# Patient Record
Sex: FEMALE | Race: OTHER | HISPANIC OR LATINO | URBAN - METROPOLITAN AREA
[De-identification: names, ages, dates, MRNs, and addresses within clinical notes are randomized per-mention and may not be internally consistent; named-entity substitution may affect disease eponyms.]

---

## 2023-12-26 ENCOUNTER — OUTPATIENT (OUTPATIENT)
Dept: EMERGENCY DEPT | Facility: HOSPITAL | Age: 53
LOS: 1 days | Discharge: HOME | End: 2023-12-26
Payer: MEDICAID

## 2023-12-26 VITALS
WEIGHT: 164.91 LBS | HEART RATE: 86 BPM | TEMPERATURE: 98 F | OXYGEN SATURATION: 97 % | RESPIRATION RATE: 20 BRPM | SYSTOLIC BLOOD PRESSURE: 117 MMHG | HEIGHT: 66 IN | DIASTOLIC BLOOD PRESSURE: 85 MMHG

## 2023-12-26 DIAGNOSIS — Z98.890 OTHER SPECIFIED POSTPROCEDURAL STATES: Chronic | ICD-10-CM

## 2023-12-26 DIAGNOSIS — R50.9 FEVER, UNSPECIFIED: ICD-10-CM

## 2023-12-26 LAB
ALBUMIN SERPL ELPH-MCNC: 3.6 G/DL — SIGNIFICANT CHANGE UP (ref 3.3–5)
ALBUMIN SERPL ELPH-MCNC: 3.6 G/DL — SIGNIFICANT CHANGE UP (ref 3.3–5)
ALP SERPL-CCNC: 57 U/L — SIGNIFICANT CHANGE UP (ref 40–120)
ALP SERPL-CCNC: 57 U/L — SIGNIFICANT CHANGE UP (ref 40–120)
ALT FLD-CCNC: 15 U/L — SIGNIFICANT CHANGE UP (ref 10–45)
ALT FLD-CCNC: 15 U/L — SIGNIFICANT CHANGE UP (ref 10–45)
ANION GAP SERPL CALC-SCNC: 10 MMOL/L — SIGNIFICANT CHANGE UP (ref 5–17)
ANION GAP SERPL CALC-SCNC: 10 MMOL/L — SIGNIFICANT CHANGE UP (ref 5–17)
APTT BLD: 29.7 SEC — SIGNIFICANT CHANGE UP (ref 24.5–35.6)
APTT BLD: 29.7 SEC — SIGNIFICANT CHANGE UP (ref 24.5–35.6)
AST SERPL-CCNC: 15 U/L — SIGNIFICANT CHANGE UP (ref 10–40)
AST SERPL-CCNC: 15 U/L — SIGNIFICANT CHANGE UP (ref 10–40)
BASOPHILS # BLD AUTO: 0.01 K/UL — SIGNIFICANT CHANGE UP (ref 0–0.2)
BASOPHILS # BLD AUTO: 0.01 K/UL — SIGNIFICANT CHANGE UP (ref 0–0.2)
BASOPHILS NFR BLD AUTO: 0.2 % — SIGNIFICANT CHANGE UP (ref 0–2)
BASOPHILS NFR BLD AUTO: 0.2 % — SIGNIFICANT CHANGE UP (ref 0–2)
BILIRUB SERPL-MCNC: 0.3 MG/DL — SIGNIFICANT CHANGE UP (ref 0.2–1.2)
BILIRUB SERPL-MCNC: 0.3 MG/DL — SIGNIFICANT CHANGE UP (ref 0.2–1.2)
BLD GP AB SCN SERPL QL: NEGATIVE — SIGNIFICANT CHANGE UP
BLD GP AB SCN SERPL QL: NEGATIVE — SIGNIFICANT CHANGE UP
BUN SERPL-MCNC: 8 MG/DL — SIGNIFICANT CHANGE UP (ref 7–23)
BUN SERPL-MCNC: 8 MG/DL — SIGNIFICANT CHANGE UP (ref 7–23)
CALCIUM SERPL-MCNC: 8.9 MG/DL — SIGNIFICANT CHANGE UP (ref 8.4–10.5)
CALCIUM SERPL-MCNC: 8.9 MG/DL — SIGNIFICANT CHANGE UP (ref 8.4–10.5)
CHLORIDE SERPL-SCNC: 106 MMOL/L — SIGNIFICANT CHANGE UP (ref 96–108)
CHLORIDE SERPL-SCNC: 106 MMOL/L — SIGNIFICANT CHANGE UP (ref 96–108)
CO2 SERPL-SCNC: 25 MMOL/L — SIGNIFICANT CHANGE UP (ref 22–31)
CO2 SERPL-SCNC: 25 MMOL/L — SIGNIFICANT CHANGE UP (ref 22–31)
CREAT SERPL-MCNC: 0.62 MG/DL — SIGNIFICANT CHANGE UP (ref 0.5–1.3)
CREAT SERPL-MCNC: 0.62 MG/DL — SIGNIFICANT CHANGE UP (ref 0.5–1.3)
EGFR: 107 ML/MIN/1.73M2 — SIGNIFICANT CHANGE UP
EGFR: 107 ML/MIN/1.73M2 — SIGNIFICANT CHANGE UP
EOSINOPHIL # BLD AUTO: 0.03 K/UL — SIGNIFICANT CHANGE UP (ref 0–0.5)
EOSINOPHIL # BLD AUTO: 0.03 K/UL — SIGNIFICANT CHANGE UP (ref 0–0.5)
EOSINOPHIL NFR BLD AUTO: 0.6 % — SIGNIFICANT CHANGE UP (ref 0–6)
EOSINOPHIL NFR BLD AUTO: 0.6 % — SIGNIFICANT CHANGE UP (ref 0–6)
GLUCOSE SERPL-MCNC: 101 MG/DL — HIGH (ref 70–99)
GLUCOSE SERPL-MCNC: 101 MG/DL — HIGH (ref 70–99)
HCG SERPL-ACNC: 4 MIU/ML — SIGNIFICANT CHANGE UP
HCG SERPL-ACNC: 4 MIU/ML — SIGNIFICANT CHANGE UP
HCT VFR BLD CALC: 39 % — SIGNIFICANT CHANGE UP (ref 34.5–45)
HCT VFR BLD CALC: 39 % — SIGNIFICANT CHANGE UP (ref 34.5–45)
HGB BLD-MCNC: 11.9 G/DL — SIGNIFICANT CHANGE UP (ref 11.5–15.5)
HGB BLD-MCNC: 11.9 G/DL — SIGNIFICANT CHANGE UP (ref 11.5–15.5)
IMM GRANULOCYTES NFR BLD AUTO: 0.2 % — SIGNIFICANT CHANGE UP (ref 0–0.9)
IMM GRANULOCYTES NFR BLD AUTO: 0.2 % — SIGNIFICANT CHANGE UP (ref 0–0.9)
INR BLD: 1.01 RATIO — SIGNIFICANT CHANGE UP (ref 0.85–1.18)
INR BLD: 1.01 RATIO — SIGNIFICANT CHANGE UP (ref 0.85–1.18)
LYMPHOCYTES # BLD AUTO: 1.89 K/UL — SIGNIFICANT CHANGE UP (ref 1–3.3)
LYMPHOCYTES # BLD AUTO: 1.89 K/UL — SIGNIFICANT CHANGE UP (ref 1–3.3)
LYMPHOCYTES # BLD AUTO: 35.4 % — SIGNIFICANT CHANGE UP (ref 13–44)
LYMPHOCYTES # BLD AUTO: 35.4 % — SIGNIFICANT CHANGE UP (ref 13–44)
MCHC RBC-ENTMCNC: 26.6 PG — LOW (ref 27–34)
MCHC RBC-ENTMCNC: 26.6 PG — LOW (ref 27–34)
MCHC RBC-ENTMCNC: 30.5 GM/DL — LOW (ref 32–36)
MCHC RBC-ENTMCNC: 30.5 GM/DL — LOW (ref 32–36)
MCV RBC AUTO: 87.2 FL — SIGNIFICANT CHANGE UP (ref 80–100)
MCV RBC AUTO: 87.2 FL — SIGNIFICANT CHANGE UP (ref 80–100)
MONOCYTES # BLD AUTO: 0.47 K/UL — SIGNIFICANT CHANGE UP (ref 0–0.9)
MONOCYTES # BLD AUTO: 0.47 K/UL — SIGNIFICANT CHANGE UP (ref 0–0.9)
MONOCYTES NFR BLD AUTO: 8.8 % — SIGNIFICANT CHANGE UP (ref 2–14)
MONOCYTES NFR BLD AUTO: 8.8 % — SIGNIFICANT CHANGE UP (ref 2–14)
NEUTROPHILS # BLD AUTO: 2.93 K/UL — SIGNIFICANT CHANGE UP (ref 1.8–7.4)
NEUTROPHILS # BLD AUTO: 2.93 K/UL — SIGNIFICANT CHANGE UP (ref 1.8–7.4)
NEUTROPHILS NFR BLD AUTO: 54.8 % — SIGNIFICANT CHANGE UP (ref 43–77)
NEUTROPHILS NFR BLD AUTO: 54.8 % — SIGNIFICANT CHANGE UP (ref 43–77)
NRBC # BLD: 0 /100 WBCS — SIGNIFICANT CHANGE UP (ref 0–0)
NRBC # BLD: 0 /100 WBCS — SIGNIFICANT CHANGE UP (ref 0–0)
PLATELET # BLD AUTO: 271 K/UL — SIGNIFICANT CHANGE UP (ref 150–400)
PLATELET # BLD AUTO: 271 K/UL — SIGNIFICANT CHANGE UP (ref 150–400)
POTASSIUM SERPL-MCNC: 3.8 MMOL/L — SIGNIFICANT CHANGE UP (ref 3.5–5.3)
POTASSIUM SERPL-MCNC: 3.8 MMOL/L — SIGNIFICANT CHANGE UP (ref 3.5–5.3)
POTASSIUM SERPL-SCNC: 3.8 MMOL/L — SIGNIFICANT CHANGE UP (ref 3.5–5.3)
POTASSIUM SERPL-SCNC: 3.8 MMOL/L — SIGNIFICANT CHANGE UP (ref 3.5–5.3)
PROT SERPL-MCNC: 6.2 G/DL — SIGNIFICANT CHANGE UP (ref 6–8.3)
PROT SERPL-MCNC: 6.2 G/DL — SIGNIFICANT CHANGE UP (ref 6–8.3)
PROTHROM AB SERPL-ACNC: 11.1 SEC — SIGNIFICANT CHANGE UP (ref 9.5–13)
PROTHROM AB SERPL-ACNC: 11.1 SEC — SIGNIFICANT CHANGE UP (ref 9.5–13)
RBC # BLD: 4.47 M/UL — SIGNIFICANT CHANGE UP (ref 3.8–5.2)
RBC # BLD: 4.47 M/UL — SIGNIFICANT CHANGE UP (ref 3.8–5.2)
RBC # FLD: 16.5 % — HIGH (ref 10.3–14.5)
RBC # FLD: 16.5 % — HIGH (ref 10.3–14.5)
RH IG SCN BLD-IMP: POSITIVE — SIGNIFICANT CHANGE UP
RH IG SCN BLD-IMP: POSITIVE — SIGNIFICANT CHANGE UP
SODIUM SERPL-SCNC: 141 MMOL/L — SIGNIFICANT CHANGE UP (ref 135–145)
SODIUM SERPL-SCNC: 141 MMOL/L — SIGNIFICANT CHANGE UP (ref 135–145)
WBC # BLD: 5.34 K/UL — SIGNIFICANT CHANGE UP (ref 3.8–10.5)
WBC # BLD: 5.34 K/UL — SIGNIFICANT CHANGE UP (ref 3.8–10.5)
WBC # FLD AUTO: 5.34 K/UL — SIGNIFICANT CHANGE UP (ref 3.8–10.5)
WBC # FLD AUTO: 5.34 K/UL — SIGNIFICANT CHANGE UP (ref 3.8–10.5)

## 2023-12-26 PROCEDURE — 88304 TISSUE EXAM BY PATHOLOGIST: CPT | Mod: 26

## 2023-12-26 PROCEDURE — 74177 CT ABD & PELVIS W/CONTRAST: CPT | Mod: 26,MA

## 2023-12-26 RX ORDER — ACETAMINOPHEN 500 MG
1000 TABLET ORAL ONCE
Refills: 0 | Status: COMPLETED | OUTPATIENT
Start: 2023-12-26 | End: 2023-12-26

## 2023-12-26 RX ORDER — INFLUENZA VIRUS VACCINE 15; 15; 15; 15 UG/.5ML; UG/.5ML; UG/.5ML; UG/.5ML
0.5 SUSPENSION INTRAMUSCULAR ONCE
Refills: 0 | Status: DISCONTINUED | OUTPATIENT
Start: 2023-12-26 | End: 2023-12-27

## 2023-12-26 RX ORDER — FENTANYL CITRATE 50 UG/ML
25 INJECTION INTRAVENOUS
Refills: 0 | Status: DISCONTINUED | OUTPATIENT
Start: 2023-12-26 | End: 2023-12-26

## 2023-12-26 RX ORDER — VANCOMYCIN HCL 1 G
1000 VIAL (EA) INTRAVENOUS ONCE
Refills: 0 | Status: COMPLETED | OUTPATIENT
Start: 2023-12-26 | End: 2023-12-26

## 2023-12-26 RX ORDER — MORPHINE SULFATE 50 MG/1
4 CAPSULE, EXTENDED RELEASE ORAL EVERY 4 HOURS
Refills: 0 | Status: DISCONTINUED | OUTPATIENT
Start: 2023-12-26 | End: 2023-12-27

## 2023-12-26 RX ORDER — PIPERACILLIN AND TAZOBACTAM 4; .5 G/20ML; G/20ML
3.38 INJECTION, POWDER, LYOPHILIZED, FOR SOLUTION INTRAVENOUS ONCE
Refills: 0 | Status: COMPLETED | OUTPATIENT
Start: 2023-12-26 | End: 2023-12-26

## 2023-12-26 RX ORDER — HEPARIN SODIUM 5000 [USP'U]/ML
5000 INJECTION INTRAVENOUS; SUBCUTANEOUS EVERY 8 HOURS
Refills: 0 | Status: DISCONTINUED | OUTPATIENT
Start: 2023-12-26 | End: 2023-12-27

## 2023-12-26 RX ORDER — OXYCODONE AND ACETAMINOPHEN 5; 325 MG/1; MG/1
1 TABLET ORAL EVERY 4 HOURS
Refills: 0 | Status: DISCONTINUED | OUTPATIENT
Start: 2023-12-26 | End: 2023-12-27

## 2023-12-26 RX ORDER — OXYCODONE AND ACETAMINOPHEN 5; 325 MG/1; MG/1
2 TABLET ORAL EVERY 6 HOURS
Refills: 0 | Status: DISCONTINUED | OUTPATIENT
Start: 2023-12-26 | End: 2023-12-27

## 2023-12-26 RX ADMIN — Medication 400 MILLIGRAM(S): at 08:04

## 2023-12-26 RX ADMIN — FENTANYL CITRATE 25 MICROGRAM(S): 50 INJECTION INTRAVENOUS at 16:30

## 2023-12-26 RX ADMIN — FENTANYL CITRATE 25 MICROGRAM(S): 50 INJECTION INTRAVENOUS at 16:20

## 2023-12-26 RX ADMIN — HEPARIN SODIUM 5000 UNIT(S): 5000 INJECTION INTRAVENOUS; SUBCUTANEOUS at 22:11

## 2023-12-26 RX ADMIN — FENTANYL CITRATE 25 MICROGRAM(S): 50 INJECTION INTRAVENOUS at 16:00

## 2023-12-26 RX ADMIN — Medication 250 MILLIGRAM(S): at 06:57

## 2023-12-26 RX ADMIN — PIPERACILLIN AND TAZOBACTAM 200 GRAM(S): 4; .5 INJECTION, POWDER, LYOPHILIZED, FOR SOLUTION INTRAVENOUS at 06:05

## 2023-12-26 RX ADMIN — OXYCODONE AND ACETAMINOPHEN 1 TABLET(S): 5; 325 TABLET ORAL at 23:11

## 2023-12-26 RX ADMIN — OXYCODONE AND ACETAMINOPHEN 1 TABLET(S): 5; 325 TABLET ORAL at 22:11

## 2023-12-26 RX ADMIN — FENTANYL CITRATE 25 MICROGRAM(S): 50 INJECTION INTRAVENOUS at 15:40

## 2023-12-26 NOTE — ED ADULT NURSE NOTE - OBJECTIVE STATEMENT
Patient is a 53 year old female complaining of back pain. Patient reports having a gluteal procedure done on may 25 2023, s/p revision on 10/26, was seen in office on 12/11 and had some bedside debridement done now complaining to the ER today for worsened pain in gluteal region at operative sites as well as fevers (Tmax 104F). On assessment patient is A&Ox4, ambulating steady, breathing comfortably on room air, no accessory muscle use, no cough, chest rise and fall equal, NSR on the cardiac monitor, no JVD, no edema noted, strong bilateral peripheral pulses, Abdomen is soft, nondistended, nontender, gluteal region has two b/l gluteal incisions that are non erythematous. Patient denies headache, dizziness, chest pain, palpitations, cough, SOB, abdominal pain, n/v/d, urinary symptoms, fevers, chills, weakness at this time.

## 2023-12-26 NOTE — ED ADULT NURSE NOTE - NS ED NURSE LEVEL OF CONSCIOUSNESS ORIENTATION
"Subjective:       Patient ID: Monique Rai is a 35 y.o. female.    Chief Complaint: Hypothyroidism (follow-up)    Ms. Rai is a 35 year old female with PMH with DRU, depression, thyroid disease who presents for hypothryoidism follow-up, DRU follow-up. Patient states she recently saw Dr. Yan who agreed with [place] 180mg. Patient states he was very thorough and agreed with the [place] for early/late dumping syndrome. Dr. Yan also stated that her CP and vision changes were also probably 2/2 inadequately controlled. States she should have went through radioactive iodine "since it metastacized" after two reactive LN"s were found. Patient needs a head and neck scan. They are going to determine a treatment plan in a month.       Review of Systems   Constitutional: Negative for chills and fever.   HENT: Negative for congestion and sore throat.    Eyes: Negative for pain and redness.   Respiratory: Negative for cough and shortness of breath.    Cardiovascular: Negative for chest pain and palpitations.   Gastrointestinal: Negative for abdominal pain, diarrhea, nausea and vomiting.   Genitourinary: Negative for difficulty urinating and dysuria.   Musculoskeletal: Negative for arthralgias and myalgias.   Skin: Negative for rash.   Neurological: Negative for syncope and headaches.   Psychiatric/Behavioral: Negative for agitation and confusion.       Objective:      Vitals:    10/11/18 1506   BP: 103/71   Pulse: 82     Physical Exam   Constitutional: She is oriented to person, place, and time. She appears well-developed and well-nourished. No distress.   HENT:   Head: Normocephalic and atraumatic.   Right Ear: External ear normal.   Left Ear: External ear normal.   Eyes: Conjunctivae are normal. Pupils are equal, round, and reactive to light. Right eye exhibits no discharge. Left eye exhibits no discharge.   Neck: Normal range of motion. No JVD present. No thyromegaly present.   Cardiovascular: Normal rate and " regular rhythm. Exam reveals no gallop and no friction rub.   No murmur heard.  Pulmonary/Chest: Effort normal and breath sounds normal. No respiratory distress. She has no wheezes. She has no rales.   Abdominal: Soft. Bowel sounds are normal. She exhibits no distension. There is no tenderness.   Musculoskeletal: Normal range of motion.   Neurological: She is alert and oriented to person, place, and time. She has normal reflexes.   Skin: Skin is warm. No rash noted. No erythema.   Psychiatric: She has a normal mood and affect. Her behavior is normal.       Assessment:       1. Postoperative hypothyroidism    2. DRU (generalized anxiety disorder)    3. Encounter for initial prescription of vaginal ring hormonal contraceptive    4. H/O thyroidectomy    5. Hx of papillary thyroid carcinoma    6. Need for prophylactic vaccination and inoculation against influenza        Plan:       Postoperative hypothyroidism    DRU (generalized anxiety disorder)  -     buPROPion (WELLBUTRIN XL) 150 MG TB24 tablet; Take 1 tablet (150 mg total) by mouth once daily.  Dispense: 30 tablet; Refill: 0    Encounter for initial prescription of vaginal ring hormonal contraceptive  -     etonogestrel-ethinyl estradiol (NUVARING) 0.12-0.015 mg/24 hr vaginal ring; Place 1 each vaginally every 28 days.  Dispense: 1 each; Refill: 11    H/O thyroidectomy    Hx of papillary thyroid carcinoma    Need for prophylactic vaccination and inoculation against influenza    Other orders  -     Influenza - Quadrivalent (3 years & older) (PF)      Follow-up in about 1 month (around 11/11/2018).       Oriented - self; Oriented - place; Oriented - time

## 2023-12-26 NOTE — H&P ADULT - ASSESSMENT
s/p removal of silicone bilateral buttocks with open wounds and drainage  Plan for OR washout and debridement followed by VAC placement  RBA reviewed with patient    NPO

## 2023-12-26 NOTE — ED ADULT NURSE REASSESSMENT NOTE - NS ED NURSE REASSESS COMMENT FT1
Received report from Krzysztof Gary. VSS. Patient endorses pain. ARSALAN Cunningham aware and placing order for pain medication at this time. Received report from rKzysztof Gary. VSS. Patient endorses pain. ARSALAN Cunningham aware and placing order for pain medication at this time.

## 2023-12-26 NOTE — ED ADULT NURSE NOTE - NSFALLUNIVINTERV_ED_ALL_ED
Medication requested: Symbicort 80-4.5 mcg/act  inhaler  Last Office Visit: 6-27-23  Next Office Visit: 6-20-24  Reason sent to MD: NA  Refill completed.       Bed/Stretcher in lowest position, wheels locked, appropriate side rails in place/Call bell, personal items and telephone in reach/Instruct patient to call for assistance before getting out of bed/chair/stretcher/Non-slip footwear applied when patient is off stretcher/[place] to call system/Physically safe environment - no spills, clutter or unnecessary equipment/Purposeful proactive rounding/Room/bathroom lighting operational, light cord in reach Bed/Stretcher in lowest position, wheels locked, appropriate side rails in place/Call bell, personal items and telephone in reach/Instruct patient to call for assistance before getting out of bed/chair/stretcher/Non-slip footwear applied when patient is off stretcher/[place] to call system/Physically safe environment - no spills, clutter or unnecessary equipment/Purposeful proactive rounding/Room/bathroom lighting operational, light cord in reach

## 2023-12-26 NOTE — ED PROVIDER NOTE - PHYSICAL EXAMINATION
see mdm Exam:   General: Non toxic, well appearing  HENT: No pharyngeal erythema/exudate, external ear exam normal  Eyes: PEERL, EOMI  Lungs: CTA B/L, no wheezing, no rales, no ronchi  Chest: Normal Heart sounds, no murmurs, no rubs no gallops  Abdomen: Soft, no tenderness, no rigidity, no guarding, neg Rovsing's signs, neg tenderness at Mckburney's point  gluteal region has two b/l gluteal incisons that are non erythematous, no overt s/o infection, packing in place apears clean, no purulence   MSK: FROM of joints, no tenderness to palpation  Skin: No new rashes or lesions  Neuro: Alert and oriented x 3, power 5/5 x 4, CN II-XII GI, no facial asymmetry, no cerebellar findings

## 2023-12-26 NOTE — H&P ADULT - NSHPLABSRESULTS_GEN_ALL_CORE
11.9   5.34  )-----------( 271      ( 26 Dec 2023 06:06 )             39.0   12-26    141  |  106  |  8   ----------------------------<  101<H>  3.8   |  25  |  0.62    Ca    8.9      26 Dec 2023 06:06    TPro  6.2  /  Alb  3.6  /  TBili  0.3  /  DBili  x   /  AST  15  /  ALT  15  /  AlkPhos  57  12-26

## 2023-12-26 NOTE — ED ADULT NURSE REASSESSMENT NOTE - NS ED NURSE REASSESS COMMENT FT1
Patient completely undressed. Belongings placed in an ED Safe envelope number [id number] and placed in Safe. Patient completely undressed. Belongings placed in an ED Safe envelope number [id number] and placed in Safe.

## 2023-12-26 NOTE — H&P ADULT - HISTORY OF PRESENT ILLNESS
Patient is 53 y.o. female who underwent excision of silicone granuloma bilateral buttocks and thighs 5/25/2023. 10/26/2023 underwent debridement of bilateral buttock seroma capsules and subsequent additional debridements. Presents with pain persistent open wounds and drainage.     Reports fevers at home    PMH: None    PSH: breast reduction, abdominoplasty, hysterectomy silicone removal    ALL: NKA         SOC:         Tob: denies    EtOH: socially         Height: 5’6”    Weight: 180 lb

## 2023-12-26 NOTE — ED ADULT NURSE NOTE - CHIEF COMPLAINT QUOTE
back pain x weeks, worsening today, pt reports surgery x8 months ago and still has 2 open wounds @ surgical site, surgeon instructed to come into ED for eval, tMax 104

## 2023-12-26 NOTE — ED PROVIDER NOTE - CLINICAL SUMMARY MEDICAL DECISION MAKING FREE TEXT BOX
Attending Montez:  50 y/o f presenting to ed w/ cc of gluteal pain and fever. Pt had gluteal procedure done on may 25 2023, s/p revioson on 10/26, was seen in office on  12/11 and had some bedside debridement done, not coming to ed w/ worsened pain in gluteal region at operative sites as well as fever, no current drainage, has packing in place currently, denies n/v, diarrhea, pos constipation, no urinary symptoms, on cephalosporin oupt. Partner states pt had a fevr of 103 this am so gave acetaminophen, no cp, sob, afebrile vitals stable  non toxic appears in mild discomfort, NC/AT,  conjunctiva non conjected, sclera anicteric, moist mucous membranes, neck supple, heart sounds, normal, no mrg, lungs cta b/l no wrr, abd soft non distended w/ no tenderness, examined gluteal region has two b/l gluteal incisons that are non erythematous, no overt s/o infection, packing in place apears clean, no purulence no visual deformities of extremities, axox3, , normal mood and affect, discussed w/ pt's plastic surgeon Dr. Dennis who plans to go to OR for washout due to persistent symptoms, will sent preop labs, will also get ct scan to screen for abscess/osteomyelitis, infectious tract, given fever of 103 today, will give broad abx. Attending Montez:  52 y/o f presenting to ed w/ cc of gluteal pain and fever. Pt had gluteal procedure done on may 25 2023, s/p revioson on 10/26, was seen in office on  12/11 and had some bedside debridement done, not coming to ed w/ worsened pain in gluteal region at operative sites as well as fever, no current drainage, has packing in place currently, denies n/v, diarrhea, pos constipation, no urinary symptoms, on cephalosporin oupt. Partner states pt had a fevr of 103 this am so gave acetaminophen, no cp, sob, afebrile vitals stable  non toxic appears in mild discomfort, NC/AT,  conjunctiva non conjected, sclera anicteric, moist mucous membranes, neck supple, heart sounds, normal, no mrg, lungs cta b/l no wrr, abd soft non distended w/ no tenderness, examined gluteal region has two b/l gluteal incisons that are non erythematous, no overt s/o infection, packing in place apears clean, no purulence no visual deformities of extremities, axox3, , normal mood and affect, discussed w/ pt's plastic surgeon Dr. Dennis who plans to go to OR for washout due to persistent symptoms, will sent preop labs, will also get ct scan to screen for abscess/osteomyelitis, infectious tract, given fever of 103 today, will give broad abx.

## 2023-12-26 NOTE — ED ADULT TRIAGE NOTE - CHIEF COMPLAINT QUOTE
back pain x weeks, worsening today, tMax 104 back pain x weeks, worsening today, pt reports surgery x8 months ago and still has 2 open wounds @ surgical site, surgeon instructed to come into ED for eval, tMax 104

## 2023-12-26 NOTE — H&P ADULT - NSHPPHYSICALEXAM_GEN_ALL_CORE
NAD   Breathing unlabored  Chest Clear  Abdomen Soft NT ND  Ext FROM No edema  Well perfused      Bilateral buttocks with ~5cm opening tracking to deeper cavity with seroma capsule drainage

## 2023-12-26 NOTE — ED PROVIDER NOTE - PROGRESS NOTE DETAILS
Attending Montez:  received phone call from Dr. Get Dennis, requesting labs be sent. Likely to go to OR, can admit under him. Will obtain labs, before admission

## 2023-12-26 NOTE — ED PROVIDER NOTE - OBJECTIVE STATEMENT
see mdm 50 y/o f presenting to ed w/ cc of gluteal pain and fever. Pt had gluteal procedure done on may 25 2023, s/p revioson on 10/26, was seen in office on  12/11 and had some bedside debridement done, not coming to ed w/ worsened pain in gluteal region at operative sites as well as fever, no current drainage, has packing in place currently, denies n/v, diarrhea, pos constipation, no urinary symptoms, on cephalosporin oupt. Partner states pt had a fevr of 103 this am so gave acetaminophen, no cp, sob

## 2023-12-26 NOTE — PATIENT PROFILE ADULT - FALL HARM RISK - HARM RISK INTERVENTIONS
Assistance with ambulation/Assistance OOB with selected safe patient handling equipment/Communicate Risk of Fall with Harm to all staff/Discuss with provider need for PT consult/Monitor gait and stability/Provide patient with walking aids - walker, cane, crutches/Reinforce activity limits and safety measures with patient and family/Sit up slowly, dangle for a short time, stand at bedside before walking/Tailored Fall Risk Interventions/Use of alarms - bed, chair and/or voice tab/Visual Cue: Yellow wristband and red socks/Bed in lowest position, wheels locked, appropriate side rails in place/Call bell, personal items and telephone in reach/Instruct patient to call for assistance before getting out of bed or chair/Non-slip footwear when patient is out of bed/[place] to call system/Physically safe environment - no spills, clutter or unnecessary equipment/Purposeful Proactive Rounding/Room/bathroom lighting operational, light cord in reach Assistance with ambulation/Assistance OOB with selected safe patient handling equipment/Communicate Risk of Fall with Harm to all staff/Discuss with provider need for PT consult/Monitor gait and stability/Provide patient with walking aids - walker, cane, crutches/Reinforce activity limits and safety measures with patient and family/Sit up slowly, dangle for a short time, stand at bedside before walking/Tailored Fall Risk Interventions/Use of alarms - bed, chair and/or voice tab/Visual Cue: Yellow wristband and red socks/Bed in lowest position, wheels locked, appropriate side rails in place/Call bell, personal items and telephone in reach/Instruct patient to call for assistance before getting out of bed or chair/Non-slip footwear when patient is out of bed/[place] to call system/Physically safe environment - no spills, clutter or unnecessary equipment/Purposeful Proactive Rounding/Room/bathroom lighting operational, light cord in reach

## 2023-12-26 NOTE — H&P ADULT - NSTOBACCOSCREENHP_GEN_A_NCS
Patient discharged to home at this time with mother. Discharge instructions and follow up care discussed, patients mother voices understanding.       Josep Tijerina RN  10/17/22 [id number]
No

## 2023-12-27 VITALS
OXYGEN SATURATION: 99 % | DIASTOLIC BLOOD PRESSURE: 60 MMHG | TEMPERATURE: 98 F | SYSTOLIC BLOOD PRESSURE: 110 MMHG | HEART RATE: 88 BPM | RESPIRATION RATE: 20 BRPM

## 2023-12-27 PROCEDURE — 86900 BLOOD TYPING SEROLOGIC ABO: CPT

## 2023-12-27 PROCEDURE — 84702 CHORIONIC GONADOTROPIN TEST: CPT

## 2023-12-27 PROCEDURE — 82330 ASSAY OF CALCIUM: CPT

## 2023-12-27 PROCEDURE — 85610 PROTHROMBIN TIME: CPT

## 2023-12-27 PROCEDURE — 85018 HEMOGLOBIN: CPT

## 2023-12-27 PROCEDURE — 83605 ASSAY OF LACTIC ACID: CPT

## 2023-12-27 PROCEDURE — 85014 HEMATOCRIT: CPT

## 2023-12-27 PROCEDURE — 11042 DBRDMT SUBQ TIS 1ST 20SQCM/<: CPT

## 2023-12-27 PROCEDURE — C9399: CPT

## 2023-12-27 PROCEDURE — 74177 CT ABD & PELVIS W/CONTRAST: CPT | Mod: MA

## 2023-12-27 PROCEDURE — 82803 BLOOD GASES ANY COMBINATION: CPT

## 2023-12-27 PROCEDURE — 88304 TISSUE EXAM BY PATHOLOGIST: CPT

## 2023-12-27 PROCEDURE — 86901 BLOOD TYPING SEROLOGIC RH(D): CPT

## 2023-12-27 PROCEDURE — 86850 RBC ANTIBODY SCREEN: CPT

## 2023-12-27 PROCEDURE — 82947 ASSAY GLUCOSE BLOOD QUANT: CPT

## 2023-12-27 PROCEDURE — 82435 ASSAY OF BLOOD CHLORIDE: CPT

## 2023-12-27 PROCEDURE — 84132 ASSAY OF SERUM POTASSIUM: CPT

## 2023-12-27 PROCEDURE — 85025 COMPLETE CBC W/AUTO DIFF WBC: CPT

## 2023-12-27 PROCEDURE — 85730 THROMBOPLASTIN TIME PARTIAL: CPT

## 2023-12-27 PROCEDURE — 84295 ASSAY OF SERUM SODIUM: CPT

## 2023-12-27 PROCEDURE — 87040 BLOOD CULTURE FOR BACTERIA: CPT

## 2023-12-27 PROCEDURE — 80053 COMPREHEN METABOLIC PANEL: CPT

## 2023-12-27 RX ADMIN — HEPARIN SODIUM 5000 UNIT(S): 5000 INJECTION INTRAVENOUS; SUBCUTANEOUS at 05:28

## 2023-12-27 NOTE — DISCHARGE NOTE PROVIDER - NSDCCPCAREPLAN_GEN_ALL_CORE_FT
PRINCIPAL DISCHARGE DIAGNOSIS  Diagnosis: Open wound of buttock  Assessment and Plan of Treatment:       SECONDARY DISCHARGE DIAGNOSES  Diagnosis: Postoperative complication  Assessment and Plan of Treatment:

## 2023-12-27 NOTE — PROGRESS NOTE ADULT - SUBJECTIVE AND OBJECTIVE BOX
Plastic Surgery Progress Note (pg [facility]: [id number], [facility]: [phone])    SUBJECTIVE  Patient seen on AM rounds, resting comfortably in bed. Pain well controlled, no complaints.    OBJECTIVE  ___________________________________________________  VITAL SIGNS / I&O's   Vital Signs Last 24 Hrs  T(C): 37.1 (27 Dec 2023 05:05), Max: 37.2 (26 Dec 2023 17:53)  T(F): 98.8 (27 Dec 2023 05:05), Max: 99 (26 Dec 2023 17:53)  HR: 97 (27 Dec 2023 05:05) (65 - 97)  BP: 99/65 (27 Dec 2023 05:05) (93/60 - 128/78)  BP(mean): 84 (26 Dec 2023 17:26) (72 - 92)  RR: 18 (27 Dec 2023 05:05) (14 - 18)  SpO2: 97% (27 Dec 2023 05:05) (92% - 100%)    Parameters below as of 27 Dec 2023 05:05  Patient On (Oxygen Delivery Method): room air          26 Dec 2023 07:01  -  27 Dec 2023 07:00  --------------------------------------------------------  IN:    Oral Fluid: 713 mL  Total IN: 713 mL    OUT:    Voided (mL): 500 mL  Total OUT: 500 mL    Total NET: 213 mL        ___________________________________________________  PHYSICAL EXAM    General: NAD  CV: Hemodynamically stable  Pulm: Nonlabored breathing on room air  Neuro: A&Ox4  MSK: Moves all extremities spontaneously. Vac holding suction    ___________________________________________________  LABS                        11.9   5.34  )-----------( 271      ( 26 Dec 2023 06:06 )             39.0     26 Dec 2023 06:06    141    |  106    |  8      ----------------------------<  101    3.8     |  25     |  0.62     Ca    8.9        26 Dec 2023 06:06    TPro  6.2    /  Alb  3.6    /  TBili  0.3    /  DBili  x      /  AST  15     /  ALT  15     /  AlkPhos  57     26 Dec 2023 06:06    PT/INR - ( 26 Dec 2023 06:06 )   PT: 11.1 sec;   INR: 1.01 ratio         PTT - ( 26 Dec 2023 06:06 )  PTT:29.7 sec  CAPILLARY BLOOD GLUCOSE    Urinalysis Basic - ( 26 Dec 2023 06:06 )    Color: x / Appearance: x / SG: x / pH: x  Gluc: 101 mg/dL / Ketone: x  / Bili: x / Urobili: x   Blood: x / Protein: x / Nitrite: x   Leuk Esterase: x / RBC: x / WBC x   Sq Epi: x / Non Sq Epi: x / Bacteria: x      ___________________________________________________  MICRO  Recent Cultures:    ___________________________________________________  MEDICATIONS  (STANDING):  heparin   Injectable 5000 Unit(s) SubCutaneous every 8 hours  influenza   Vaccine 0.5 milliLiter(s) IntraMuscular once    MEDICATIONS  (PRN):  morphine  - Injectable 4 milliGRAM(s) IV Push every 4 hours PRN Severe Breakthrough Pain (7 - 10)  oxycodone    5 mG/acetaminophen 325 mG 1 Tablet(s) Oral every 4 hours PRN Moderate Pain (4 - 6)  oxycodone    5 mG/acetaminophen 325 mG 2 Tablet(s) Oral every 6 hours PRN Severe Pain (7 - 10)   Plastic Surgery Progress Note (pg [facility]: [id number], [facility]: [phone])    SUBJECTIVE  Patient seen on AM rounds, resting comfortably in bed. Pain well controlled, no complaints.    OBJECTIVE  ___________________________________________________  VITAL SIGNS / I&O's   Vital Signs Last 24 Hrs  T(C): 37.1 (27 Dec 2023 05:05), Max: 37.2 (26 Dec 2023 17:53)  T(F): 98.8 (27 Dec 2023 05:05), Max: 99 (26 Dec 2023 17:53)  HR: 97 (27 Dec 2023 05:05) (65 - 97)  BP: 99/65 (27 Dec 2023 05:05) (93/60 - 128/78)  BP(mean): 84 (26 Dec 2023 17:26) (72 - 92)  RR: 18 (27 Dec 2023 05:05) (14 - 18)  SpO2: 97% (27 Dec 2023 05:05) (92% - 100%)    Parameters below as of 27 Dec 2023 05:05  Patient On (Oxygen Delivery Method): room air          26 Dec 2023 07:01  -  27 Dec 2023 07:00  --------------------------------------------------------  IN:    Oral Fluid: 713 mL  Total IN: 713 mL    OUT:    Voided (mL): 500 mL  Total OUT: 500 mL    Total NET: 213 mL        ___________________________________________________  PHYSICAL EXAM    General: NAD  CV: Hemodynamically stable  Pulm: Nonlabored breathing on room air  Neuro: A&Ox4  MSK: Moves all extremities spontaneously. Vac holding suction    ___________________________________________________  LABS                        11.9   5.34  )-----------( 271      ( 26 Dec 2023 06:06 )             39.0     26 Dec 2023 06:06    141    |  106    |  8      ----------------------------<  101    3.8     |  25     |  0.62     Ca    8.9        26 Dec 2023 06:06    TPro  6.2    /  Alb  3.6    /  TBili  0.3    /  DBili  x      /  AST  15     /  ALT  15     /  AlkPhos  57     26 Dec 2023 06:06    PT/INR - ( 26 Dec 2023 06:06 )   PT: 11.1 sec;   INR: 1.01 ratio         PTT - ( 26 Dec 2023 06:06 )  PTT:29.7 sec  CAPILLARY BLOOD GLUCOSE    Urinalysis Basic - ( 26 Dec 2023 06:06 )    Color: x / Appearance: x / SG: x / pH: x  Gluc: 101 mg/dL / Ketone: x  / Bili: x / Urobili: x   Blood: x / Protein: x / Nitrite: x   Leuk Esterase: x / RBC: x / WBC x   Sq Epi: x / Non Sq Epi: x / Bacteria: x      ___________________________________________________  MICRO  Recent Cultures:    ___________________________________________________  MEDICATIONS  (STANDING):  heparin   Injectable 5000 Unit(s) SubCutaneous every 8 hours  influenza   Vaccine 0.5 milliLiter(s) IntraMuscular once    MEDICATIONS  (PRN):  morphine  - Injectable 4 milliGRAM(s) IV Push every 4 hours PRN Severe Breakthrough Pain (7 - 10)  oxycodone    5 mG/acetaminophen 325 mG 1 Tablet(s) Oral every 4 hours PRN Moderate Pain (4 - 6)  oxycodone    5 mG/acetaminophen 325 mG 2 Tablet(s) Oral every 6 hours PRN Severe Pain (7 - 10)

## 2023-12-27 NOTE — DISCHARGE NOTE PROVIDER - CARE PROVIDER_API CALL
Get Dennis  Plastic Surgery  [address] [place] [address]  Phone: [phone]  Fax: [phone]  Established Patient  Follow Up Time:    Get Dennis  Plastic Surgery  [address] [place] [address]  Phone: [phone]  Fax: [phone]  Established Patient  Follow Up Time:

## 2023-12-27 NOTE — DISCHARGE NOTE PROVIDER - NSDCFUADDINST_GEN_ALL_CORE_FT
Continue your Prevena wound vac therapy and follow up with Dr. Dennis in the office. Do not submerge your wound or dressing in water.

## 2023-12-27 NOTE — DISCHARGE NOTE PROVIDER - HOSPITAL COURSE
52 yo F with history of silicone removal from bilateral buttocks 5/25/23 requiring subsequent debridement 10/26 and 12/11 presented to the ED on 12/26 with fevers and gluteal pain. Patient admitted for washout, debridement, and VAC to open wounds of bilateral buttocks 12/26. Tolerated well, remained afebrile and hemodynamically stable overnight with adequate pain control. Suitable for discharge to home 12/27 with continued vac therapy and outpatient followup. 54 yo F with history of silicone removal from bilateral buttocks 5/25/23 requiring subsequent debridement 10/26 and 12/11 presented to the ED on 12/26 with fevers and gluteal pain. Patient admitted for washout, debridement, and VAC to open wounds of bilateral buttocks 12/26. Tolerated well, remained afebrile and hemodynamically stable overnight with adequate pain control. Suitable for discharge to home 12/27 with continued vac therapy and outpatient followup.

## 2023-12-27 NOTE — PROGRESS NOTE ADULT - ASSESSMENT
53yFemale with history of silicone to bilateral buttocks removed 525/23 with open wounds and drainage, now s/p repeat washout and debridement followed by VAC placement 12/26. Recovering appropriately, afebrile and hemodynamically stable, vac dressing holding suction.     Plan:  - Will transition to home Prevena when  brings from home  - Dispo: dc today, outpatient follow up  - Regular diet  - Ambulate as tolerated  - Pain control  - DVT ppx: [facility]    Plastic and Reconstructive Surgery  NS: [phone] 53yFemale with history of silicone to bilateral buttocks removed 525/23 with open wounds and drainage, now s/p repeat washout and debridement followed by VAC placement 12/26. Recovering appropriately, afebrile and hemodynamically stable, vac dressing holding suction.     Plan:  - Will transition to home Prevena when  brings from home  - Dispo: dc today, outpatient follow up  - Regular diet  - Ambulate as tolerated  - Pain control  - DVT ppx: [facility]    Plastic and Reconstructive Surgery  NS: [phone]

## 2023-12-27 NOTE — DISCHARGE NOTE NURSING/CASE MANAGEMENT/SOCIAL WORK - NSDCPEFALRISK_GEN_ALL_CORE
For information on Fall & Injury Prevention, visit: [email or web address] [email or web address]-[email or web address] OR  [email or web address] [email or web address] OR  https://www.cdc.gov/steadi/patient.html For information on Fall & Injury Prevention, visit: [email or web address]-[email or web address]-[email or web address] OR  [email or web address]/[email or web address]-tips-[email or web address]-injury OR  https://www.cdc.gov/steadi/patient.html

## 2023-12-27 NOTE — DISCHARGE NOTE NURSING/CASE MANAGEMENT/SOCIAL WORK - PATIENT PORTAL LINK FT
You can access the FollowMyHealth Patient Portal offered by [facility] by registering at the following website: [email or web address]. By joining [employer]’s FollowMyHealth portal, you will also be able to view your health information using other applications (apps) compatible with our system. You can access the FollowMyHealth Patient Portal offered by [facility] by registering at the following website: [email or web address]. By joining [employer]’s FollowMyHealth portal, you will also be able to view your health information using other applications (apps) compatible with our system.

## 2023-12-31 LAB
CULTURE RESULTS: SIGNIFICANT CHANGE UP
SPECIMEN SOURCE: SIGNIFICANT CHANGE UP

## 2024-01-02 LAB
SURGICAL PATHOLOGY STUDY: SIGNIFICANT CHANGE UP
SURGICAL PATHOLOGY STUDY: SIGNIFICANT CHANGE UP

## 2024-02-02 NOTE — PATIENT PROFILE ADULT - NSPROPASSIVESMOKEEXPOSURE_GEN_A_NUR
Assumed care of pt  at 1900. Report received from Day RN. Pt is A&O x 4. Patient stated that their pain is 6/10, pt has been medicated per the MAR. Pt's pain comfort goal is 3/10. Pt educated on how to use the call light, pt verbalized understanding. Bed in lowest locked position, call light within reach, hourly rounding in place. Labs reviewed, orders reviewed, communication board updated. Pt declines any further needs at this time.    No

## 2024-02-23 ENCOUNTER — INPATIENT (INPATIENT)
Facility: HOSPITAL | Age: 54
LOS: 1 days | Discharge: HOME | DRG: 857 | End: 2024-02-25
Attending: SURGERY | Admitting: SURGERY
Payer: MEDICAID

## 2024-02-23 VITALS
DIASTOLIC BLOOD PRESSURE: 81 MMHG | HEART RATE: 86 BPM | SYSTOLIC BLOOD PRESSURE: 139 MMHG | OXYGEN SATURATION: 100 % | WEIGHT: 164.91 LBS | RESPIRATION RATE: 20 BRPM | TEMPERATURE: 99 F | HEIGHT: 66 IN

## 2024-02-23 DIAGNOSIS — Z98.890 OTHER SPECIFIED POSTPROCEDURAL STATES: Chronic | ICD-10-CM

## 2024-02-23 DIAGNOSIS — T14.8XXA OTHER INJURY OF UNSPECIFIED BODY REGION, INITIAL ENCOUNTER: ICD-10-CM

## 2024-02-23 LAB
ALBUMIN SERPL ELPH-MCNC: 3.9 G/DL — SIGNIFICANT CHANGE UP (ref 3.3–5)
ALP SERPL-CCNC: 63 U/L — SIGNIFICANT CHANGE UP (ref 40–120)
ALT FLD-CCNC: 13 U/L — SIGNIFICANT CHANGE UP (ref 10–45)
ANION GAP SERPL CALC-SCNC: 11 MMOL/L — SIGNIFICANT CHANGE UP (ref 5–17)
AST SERPL-CCNC: 25 U/L — SIGNIFICANT CHANGE UP (ref 10–40)
BASE EXCESS BLDV CALC-SCNC: 4.1 MMOL/L — HIGH (ref -2–3)
BASOPHILS # BLD AUTO: 0.02 K/UL — SIGNIFICANT CHANGE UP (ref 0–0.2)
BASOPHILS NFR BLD AUTO: 0.4 % — SIGNIFICANT CHANGE UP (ref 0–2)
BILIRUB SERPL-MCNC: 0.2 MG/DL — SIGNIFICANT CHANGE UP (ref 0.2–1.2)
BUN SERPL-MCNC: 13 MG/DL — SIGNIFICANT CHANGE UP (ref 7–23)
CA-I SERPL-SCNC: 1.25 MMOL/L — SIGNIFICANT CHANGE UP (ref 1.15–1.33)
CALCIUM SERPL-MCNC: 9.2 MG/DL — SIGNIFICANT CHANGE UP (ref 8.4–10.5)
CHLORIDE BLDV-SCNC: 104 MMOL/L — SIGNIFICANT CHANGE UP (ref 96–108)
CHLORIDE SERPL-SCNC: 103 MMOL/L — SIGNIFICANT CHANGE UP (ref 96–108)
CO2 BLDV-SCNC: 33 MMOL/L — HIGH (ref 22–26)
CO2 SERPL-SCNC: 26 MMOL/L — SIGNIFICANT CHANGE UP (ref 22–31)
CREAT SERPL-MCNC: 0.54 MG/DL — SIGNIFICANT CHANGE UP (ref 0.5–1.3)
EGFR: 110 ML/MIN/1.73M2 — SIGNIFICANT CHANGE UP
EOSINOPHIL # BLD AUTO: 0.06 K/UL — SIGNIFICANT CHANGE UP (ref 0–0.5)
EOSINOPHIL NFR BLD AUTO: 1.1 % — SIGNIFICANT CHANGE UP (ref 0–6)
GAS PNL BLDV: 137 MMOL/L — SIGNIFICANT CHANGE UP (ref 136–145)
GAS PNL BLDV: SIGNIFICANT CHANGE UP
GLUCOSE BLDV-MCNC: 82 MG/DL — SIGNIFICANT CHANGE UP (ref 70–99)
GLUCOSE SERPL-MCNC: 83 MG/DL — SIGNIFICANT CHANGE UP (ref 70–99)
HCG UR QL: NEGATIVE — SIGNIFICANT CHANGE UP
HCO3 BLDV-SCNC: 31 MMOL/L — HIGH (ref 22–29)
HCT VFR BLD CALC: 37.6 % — SIGNIFICANT CHANGE UP (ref 34.5–45)
HCT VFR BLDA CALC: 37 % — SIGNIFICANT CHANGE UP (ref 34.5–46.5)
HGB BLD CALC-MCNC: 12.3 G/DL — SIGNIFICANT CHANGE UP (ref 11.7–16.1)
HGB BLD-MCNC: 11.5 G/DL — SIGNIFICANT CHANGE UP (ref 11.5–15.5)
IMM GRANULOCYTES NFR BLD AUTO: 0.2 % — SIGNIFICANT CHANGE UP (ref 0–0.9)
LACTATE BLDV-MCNC: 1.1 MMOL/L — SIGNIFICANT CHANGE UP (ref 0.5–2)
LYMPHOCYTES # BLD AUTO: 2.26 K/UL — SIGNIFICANT CHANGE UP (ref 1–3.3)
LYMPHOCYTES # BLD AUTO: 41.8 % — SIGNIFICANT CHANGE UP (ref 13–44)
MCHC RBC-ENTMCNC: 25.7 PG — LOW (ref 27–34)
MCHC RBC-ENTMCNC: 30.6 GM/DL — LOW (ref 32–36)
MCV RBC AUTO: 84.1 FL — SIGNIFICANT CHANGE UP (ref 80–100)
MONOCYTES # BLD AUTO: 0.52 K/UL — SIGNIFICANT CHANGE UP (ref 0–0.9)
MONOCYTES NFR BLD AUTO: 9.6 % — SIGNIFICANT CHANGE UP (ref 2–14)
NEUTROPHILS # BLD AUTO: 2.54 K/UL — SIGNIFICANT CHANGE UP (ref 1.8–7.4)
NEUTROPHILS NFR BLD AUTO: 46.9 % — SIGNIFICANT CHANGE UP (ref 43–77)
NRBC # BLD: 0 /100 WBCS — SIGNIFICANT CHANGE UP (ref 0–0)
PCO2 BLDV: 58 MMHG — HIGH (ref 39–42)
PH BLDV: 7.34 — SIGNIFICANT CHANGE UP (ref 7.32–7.43)
PLATELET # BLD AUTO: 344 K/UL — SIGNIFICANT CHANGE UP (ref 150–400)
PO2 BLDV: 28 MMHG — SIGNIFICANT CHANGE UP (ref 25–45)
POTASSIUM BLDV-SCNC: 4.4 MMOL/L — SIGNIFICANT CHANGE UP (ref 3.5–5.1)
POTASSIUM SERPL-MCNC: 4.6 MMOL/L — SIGNIFICANT CHANGE UP (ref 3.5–5.3)
POTASSIUM SERPL-SCNC: 4.6 MMOL/L — SIGNIFICANT CHANGE UP (ref 3.5–5.3)
PROT SERPL-MCNC: 7 G/DL — SIGNIFICANT CHANGE UP (ref 6–8.3)
RBC # BLD: 4.47 M/UL — SIGNIFICANT CHANGE UP (ref 3.8–5.2)
RBC # FLD: 15.7 % — HIGH (ref 10.3–14.5)
SAO2 % BLDV: 32.7 % — LOW (ref 67–88)
SODIUM SERPL-SCNC: 140 MMOL/L — SIGNIFICANT CHANGE UP (ref 135–145)
WBC # BLD: 5.41 K/UL — SIGNIFICANT CHANGE UP (ref 3.8–10.5)
WBC # FLD AUTO: 5.41 K/UL — SIGNIFICANT CHANGE UP (ref 3.8–10.5)

## 2024-02-23 PROCEDURE — 99285 EMERGENCY DEPT VISIT HI MDM: CPT

## 2024-02-23 PROCEDURE — 93010 ELECTROCARDIOGRAM REPORT: CPT

## 2024-02-23 RX ORDER — ONDANSETRON 8 MG/1
4 TABLET, FILM COATED ORAL EVERY 4 HOURS
Refills: 0 | Status: DISCONTINUED | OUTPATIENT
Start: 2024-02-23 | End: 2024-02-24

## 2024-02-23 RX ORDER — SENNA PLUS 8.6 MG/1
2 TABLET ORAL AT BEDTIME
Refills: 0 | Status: DISCONTINUED | OUTPATIENT
Start: 2024-02-23 | End: 2024-02-24

## 2024-02-23 RX ORDER — INFLUENZA VIRUS VACCINE 15; 15; 15; 15 UG/.5ML; UG/.5ML; UG/.5ML; UG/.5ML
0.5 SUSPENSION INTRAMUSCULAR ONCE
Refills: 0 | Status: DISCONTINUED | OUTPATIENT
Start: 2024-02-23 | End: 2024-02-25

## 2024-02-23 RX ORDER — ENOXAPARIN SODIUM 100 MG/ML
40 INJECTION SUBCUTANEOUS EVERY 24 HOURS
Refills: 0 | Status: DISCONTINUED | OUTPATIENT
Start: 2024-02-23 | End: 2024-02-24

## 2024-02-23 RX ORDER — ACETAMINOPHEN 500 MG
975 TABLET ORAL EVERY 6 HOURS
Refills: 0 | Status: DISCONTINUED | OUTPATIENT
Start: 2024-02-23 | End: 2024-02-24

## 2024-02-23 RX ORDER — IBUPROFEN 200 MG
400 TABLET ORAL EVERY 6 HOURS
Refills: 0 | Status: DISCONTINUED | OUTPATIENT
Start: 2024-02-23 | End: 2024-02-24

## 2024-02-23 RX ORDER — LANOLIN ALCOHOL/MO/W.PET/CERES
3 CREAM (GRAM) TOPICAL AT BEDTIME
Refills: 0 | Status: DISCONTINUED | OUTPATIENT
Start: 2024-02-23 | End: 2024-02-24

## 2024-02-23 RX ORDER — OXYCODONE HYDROCHLORIDE 5 MG/1
5 TABLET ORAL EVERY 4 HOURS
Refills: 0 | Status: DISCONTINUED | OUTPATIENT
Start: 2024-02-23 | End: 2024-02-24

## 2024-02-23 RX ORDER — SODIUM CHLORIDE 9 MG/ML
1000 INJECTION, SOLUTION INTRAVENOUS
Refills: 0 | Status: DISCONTINUED | OUTPATIENT
Start: 2024-02-24 | End: 2024-02-24

## 2024-02-23 RX ORDER — DIPHENHYDRAMINE HCL 50 MG
25 CAPSULE ORAL EVERY 4 HOURS
Refills: 0 | Status: DISCONTINUED | OUTPATIENT
Start: 2024-02-23 | End: 2024-02-24

## 2024-02-23 RX ADMIN — OXYCODONE HYDROCHLORIDE 5 MILLIGRAM(S): 5 TABLET ORAL at 14:41

## 2024-02-23 RX ADMIN — ENOXAPARIN SODIUM 40 MILLIGRAM(S): 100 INJECTION SUBCUTANEOUS at 17:08

## 2024-02-23 RX ADMIN — SENNA PLUS 2 TABLET(S): 8.6 TABLET ORAL at 21:10

## 2024-02-23 RX ADMIN — OXYCODONE HYDROCHLORIDE 5 MILLIGRAM(S): 5 TABLET ORAL at 13:41

## 2024-02-23 NOTE — H&P ADULT - NSHPLABSRESULTS_GEN_ALL_CORE
LABS:  cret                        11.5   5.41  )-----------( 344      ( 23 Feb 2024 11:28 )             37.6     02-23    140  |  103  |  13  ----------------------------<  83  4.6   |  26  |  0.54    Ca    9.2      23 Feb 2024 11:28    TPro  7.0  /  Alb  3.9  /  TBili  0.2  /  DBili  x   /  AST  25  /  ALT  13  /  AlkPhos  63  02-23

## 2024-02-23 NOTE — ED ADULT TRIAGE NOTE - CHIEF COMPLAINT QUOTE
Remote BP  
bilateral buttock infection to surgical site and fever since last night   had I&D x2 months ago

## 2024-02-23 NOTE — ED ADULT NURSE NOTE - OBJECTIVE STATEMENT
54YOF BIB self c/o fever and b/l buttock incision pain, had incision and drainage 2 months ago for post-op abscess, no hx, denies allergies and home medications. Dressings have been performed by outpatient plastic surgeon. Highest fever taken at home 103F orally. Incisions appear clean, no drainage present, dressings dry and intact, pt endorses moderate pain and tenderness on palpation to area around sites, packing mild bleeding. Pt breathing spontaneously, peripheral pulses present x4. Denies chest pain/SOB/nausea/vomiting/diarrhea/constipation. Denies chills. Pt ambulates at home, AOx4.

## 2024-02-23 NOTE — ED PROVIDER NOTE - ATTENDING CONTRIBUTION TO CARE
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  Tmax 103F yesterday.  No vomiting or diarrhea no difficulty urinating or defecating.    Review of Systems:  -General: + Fever   -ENT: no congestion  -Pulmonary: no cough, no shortness of breath  -Cardiac: no chest pain  -Gastrointestinal: no abdominal pain, no nausea, no vomiting, and no diarrhea.  -Genitourinary: no blood or pain with urination  -Musculoskeletal: no back or neck pain  -Endocrine: No h/o diabetes    On Physical Exam:  General: well appearing, in NAD, speaking clearly in full sentences and without difficulty; cooperative with exam  HEENT: anicteric sclera, airway patent  Neck: no JVD  Abdomen: soft nontender/nondistended  : no bladder tenderness or distension  Back:  Skin: intact, no rash  Extremities: no peripheral edema, no gross deformities    MDM: Concern for possible abscess or infection of surgical sites.  Is not febrile or appearing septic on initial evaluation here.  Case discussed with plastic surgery attending Dr. Dennis who is aware of patient and request admission to his service; case also discussed with plastic surgery resident/team at bedside no antibiotics at this time sending screening labs/presurgical lab panel and admitting.  No gross evidence of necrotizing fasciitis on exam no gross evidence of sepsis on overall exam and evaluation.

## 2024-02-23 NOTE — ED PROVIDER NOTE - CLINICAL SUMMARY MEDICAL DECISION MAKING FREE TEXT BOX
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  Also endorsing pain over bilateral incision areas.  Discussed with plastic surgeon Dr. Get Dennis who told her to come to emergency department for further evaluation.  Patient denies headache, chest pain, shortness of breath, abdominal pain, nausea, vomiting, diarrhea, cough, nasal congestion, dysuria, hematuria, extremity edema, rash.  Last took Tylenol this morning at 8 AM.    In the emergency department patient is hemodynamically stable and afebrile.  On exam patient has 2 bilateral incisions to the superior lateral aspect of bilateral buttocks roughly 7 cm long each, open with packing in place.  Minimal tenderness without any significant erythema along edge of wound.  Packing covered and yellow/clear fluid.  No active draining.  Neurovascularly intact bilateral lower extremities.  Able to ambulate without difficulty.  Discussed case with Dr. Get Dennis who is aware of patient and is requesting admission to his service.  Requesting preop labs be drawn.  Not requesting cultures at this time.  Will page plastic surgery resident to notify patient is here.  Will admit to Dr. Dennis service.  Patient in agreement with plan.

## 2024-02-23 NOTE — H&P ADULT - NSHPPHYSICALEXAM_GEN_ALL_CORE
PHYSICAL EXAM  GENERAL: NAD, lying in bed comfortably  CHEST: nonlabored, no increased WOB  ABDOMEN: Soft, Nontender, Nondistended.  BUTTOCK: Upper right buttock incision packed and with dressings c/d/i.   EXTREMITIES: Well perfused. No clubbing, cyanosis, or edema  NERVOUS SYSTEM:  Alert & Oriented X3

## 2024-02-23 NOTE — H&P ADULT - HISTORY OF PRESENT ILLNESS
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 S/p washout/debridement and VAC to open wounds of bilateral buttocks s/p silicone removal (initial excision 5/25/23, debridement 10/26, office debridement 12/11).    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 S/p washout/debridement and VAC to open wounds of bilateral buttocks s/p silicone removal (initial excision 5/25/23, debridement 10/26, office debridement 12/11).

## 2024-02-23 NOTE — CHART NOTE - NSCHARTNOTEFT_GEN_A_CORE
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 S/p washout/debridement and VAC to open wounds of bilateral buttocks s/p silicone removal (initial excision 5/25/23, debridement 10/26, office debridement 12/11). Presented with non-healing wounds and intermittent fevers.       Past Medical History:  No pertinent past medical history    Granuloma of buttock        Allergies: No Known Allergies      Medications:        Vital Signs:   T(F): 98.7 (13:36), Max: 98.7 (13:36)  HR: 88 (13:36)  BP: 113/76 (13:36)  RR: 17 (13:36)  SpO2: 100% (13:36)    Labs:           11.5  5.41)-----(344     (02-23-24 @ 11:28)         37.6     140 | 103 | 13  --------------------< 83     (02-23-24 @ 11:28)  4.6 | 26 | 0.54       PT: 11.4 02-23-24 @ 12:03  aPTT: 29.0 02-23-24 @ 12:03   INR: 1.04 02-23-24 @ 12:03    Consent: In the chart     Procedure Plan: Buttock debridement, wash out, and possible Vac placement.     Plan  -NPO pmn  -IVF while NPO  -4 AM pre-op labs; CBC, BMP, Mg, Phos, Coags, T&S    Plastic Surgery,   [phone]

## 2024-02-23 NOTE — H&P ADULT - NSICDXPASTSURGICALHX_GEN_ALL_CORE_FT
PAST SURGICAL HISTORY:  S/P abdominoplasty     S/P debridement     Status post breast reduction

## 2024-02-23 NOTE — H&P ADULT - ASSESSMENT
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 S/p washout/debridement and VAC to open wounds of bilateral buttocks s/p silicone removal (initial excision 5/25/23, debridement 10/26, office debridement 12/11).       Plan:     -Admit to Dr. 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 S/p washout/debridement and VAC to open wounds of bilateral buttocks s/p silicone removal (initial excision 5/25/23, debridement 10/26, office debridement 12/11).     Plan:     -Admit to Dr. 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 S/p washout/debridement and VAC to open wounds of bilateral buttocks s/p silicone removal (initial excision 5/25/23, debridement 10/26, office debridement 12/11).     Plan:     -Admit to Dr. Dennis   -No Abx at this time   -Add-on for tomorrow  -Consented   -DVT prophylaxis w/ SQH     -Diet: Regular diet, NPO/IVF pmn   -Analgesia  as needed    Plastic Surgery,   [phone]    Patient with non-healing wounds and intermittent fevers plan for debridement tomorrow and VAC.

## 2024-02-23 NOTE — H&P ADULT - NSHPREVIEWOFSYSTEMS_GEN_ALL_CORE
REVIEW OF SYSTEMS:    CONSTITUTIONAL: No weakness, fevers or chills  EYES/ENT: No visual changes;  No congestion, rhinorrhea; No sore throat or dysphagia   NECK: No pain or stiffness  RESPIRATORY: No cough, wheezing, hemoptysis; No shortness of breath  CARDIOVASCULAR: No chest pain or palpitations  GASTROINTESTINAL: No abdominal pain. No nausea, vomiting, or hematemesis; No diarrhea or constipation. No melena or hematochezia.  GENITOURINARY: No dysuria, frequency or hematuria  NEUROLOGICAL: No numbness or weakness in BL UE/LE  SKIN: Foul-smelling drainage from bilateral incisions.  All other review of systems is negative unless indicated above.

## 2024-02-23 NOTE — ED PROVIDER NOTE - PHYSICAL EXAMINATION
Gen: WDWN, NAD  HEENT: EOMI, no nasal discharge, mucous membranes moist  CV: RRR, +S1/S2, no M/R/G, 2+ radial pulses b/l  Resp: CTAB, no W/R/R, no accessory muscle use, no increased work of breathing  GI: Abdomen soft non-distended, NTTP  MSK/Skin: 2 bilateral incisions to the superior lateral aspect of bilateral buttocks roughly 7 cm long each, open with packing in place.  Minimal tenderness without any significant erythema along edge of wound.  Packing covered and yellow/clear fluid.  No active draining.  Neurovascularly intact bilateral lower extremities.  Able to ambulate without difficulty  Neuro: A&Ox4, following commands, moving all four extremities spontaneously  Psych: appropriate mood

## 2024-02-23 NOTE — ED ADULT NURSE NOTE - CHPI ED NUR SYMPTOMS NEG
no back pain/no chills/no decreased eating/drinking/no dizziness/no headache/no loss of consciousness/no nausea/no vomiting

## 2024-02-23 NOTE — ED PROVIDER NOTE - OBJECTIVE STATEMENT
Patient Education     Managing Atopic Dermatitis (Eczema)     After bathing, gently pat your skin dry (don’t rub). Apply moisturizer while your skin is still damp.   To manage your symptoms and help reduce the severity and frequency, try these self-care tips:   Caring for your skin  · Use a gentle, fragrance-free cleanser (or soap substitute cleanser) for bathing. Rinse well. Pat skin dry.  · Take warm, not hot, baths or showers. Try to limit them to no more that 10 to 15 minutes.   · Use moisturizer liberally right after you bathe, while your skin is still damp.  · Try not to scratch because it will cause more damage to your skin.   · Topical, over-the-counter hydrocortisone cream may help control mild symptoms.     Controlling your environment  · Stay out of extreme heat or cold.  · Stay out of very humid or very dry air.  · If your home or office air is very dry, use a humidifier.  · Stay away from allergens such as dust that may be in bedding, carpets, plush toys, or rugs.  · Know that pet hair and dander can cause flare-ups.    Seeking medical treatment  Another way to keep symptoms under control is to seek medical treatment. Talk with your healthcare provider about the type of treatment that may work best for you. Your provider may prescribe treatments such as:   · Treatments to put on the skin daily  · Medicines taken by mouth (oral medicines) such as antihistamines, antibiotics, or corticosteroids  · In severe cases, you may need shots (injections) to control the symptoms. You may even need antibiotics if skin infections occur.  Treatments don’t work the same way for every person. So if your symptoms continue or get worse, ask your healthcare provider about other treatments.   Making lifestyle choices  · Manage the stress in your life.  · Wear loose-fitting cotton clothing that does not bind or rub your skin.  · Don't wear wool or other scratchy fabrics.  · Use fragrance-free products.    Next step  Now  that you know more about atopic dermatitis, the next step is up to you. Follow your healthcare provider’s treatment plan and your self-care routine. This will help bring atopic dermatitis under control. If your symptoms persist, be sure to let your health care provider know.   Enrike last reviewed this educational content on 8/1/2019  © 3124-1340 [employer]. All rights reserved. This information is not intended as a substitute for professional medical care. Always follow your healthcare professional's instructions.            SEE MDM FOR HPI

## 2024-02-24 RX ORDER — SENNA PLUS 8.6 MG/1
2 TABLET ORAL AT BEDTIME
Refills: 0 | Status: DISCONTINUED | OUTPATIENT
Start: 2024-02-24 | End: 2024-02-25

## 2024-02-24 RX ORDER — ONDANSETRON 8 MG/1
4 TABLET, FILM COATED ORAL EVERY 4 HOURS
Refills: 0 | Status: DISCONTINUED | OUTPATIENT
Start: 2024-02-24 | End: 2024-02-24

## 2024-02-24 RX ORDER — OXYCODONE HYDROCHLORIDE 5 MG/1
5 TABLET ORAL EVERY 4 HOURS
Refills: 0 | Status: DISCONTINUED | OUTPATIENT
Start: 2024-02-24 | End: 2024-02-25

## 2024-02-24 RX ORDER — BENZOCAINE AND MENTHOL 5; 1 G/100ML; G/100ML
1 LIQUID ORAL EVERY 4 HOURS
Refills: 0 | Status: DISCONTINUED | OUTPATIENT
Start: 2024-02-24 | End: 2024-02-25

## 2024-02-24 RX ORDER — DIPHENHYDRAMINE HCL 50 MG
25 CAPSULE ORAL EVERY 4 HOURS
Refills: 0 | Status: DISCONTINUED | OUTPATIENT
Start: 2024-02-24 | End: 2024-02-25

## 2024-02-24 RX ORDER — HYDROMORPHONE HYDROCHLORIDE 2 MG/ML
0.5 INJECTION INTRAMUSCULAR; INTRAVENOUS; SUBCUTANEOUS
Refills: 0 | Status: DISCONTINUED | OUTPATIENT
Start: 2024-02-24 | End: 2024-02-24

## 2024-02-24 RX ORDER — LANOLIN ALCOHOL/MO/W.PET/CERES
3 CREAM (GRAM) TOPICAL AT BEDTIME
Refills: 0 | Status: DISCONTINUED | OUTPATIENT
Start: 2024-02-24 | End: 2024-02-25

## 2024-02-24 RX ORDER — ENOXAPARIN SODIUM 100 MG/ML
40 INJECTION SUBCUTANEOUS EVERY 24 HOURS
Refills: 0 | Status: DISCONTINUED | OUTPATIENT
Start: 2024-02-25 | End: 2024-02-25

## 2024-02-24 RX ORDER — CEFAZOLIN SODIUM 1 G
2000 VIAL (EA) INJECTION EVERY 8 HOURS
Refills: 0 | Status: DISCONTINUED | OUTPATIENT
Start: 2024-02-24 | End: 2024-02-25

## 2024-02-24 RX ORDER — HYDROMORPHONE HYDROCHLORIDE 2 MG/ML
1 INJECTION INTRAMUSCULAR; INTRAVENOUS; SUBCUTANEOUS
Refills: 0 | Status: DISCONTINUED | OUTPATIENT
Start: 2024-02-24 | End: 2024-02-24

## 2024-02-24 RX ORDER — HYDROMORPHONE HYDROCHLORIDE 2 MG/ML
0.5 INJECTION INTRAMUSCULAR; INTRAVENOUS; SUBCUTANEOUS EVERY 4 HOURS
Refills: 0 | Status: DISCONTINUED | OUTPATIENT
Start: 2024-02-24 | End: 2024-02-25

## 2024-02-24 RX ORDER — ACETAMINOPHEN 500 MG
975 TABLET ORAL EVERY 6 HOURS
Refills: 0 | Status: DISCONTINUED | OUTPATIENT
Start: 2024-02-24 | End: 2024-02-25

## 2024-02-24 RX ORDER — OXYCODONE HYDROCHLORIDE 5 MG/1
10 TABLET ORAL EVERY 6 HOURS
Refills: 0 | Status: DISCONTINUED | OUTPATIENT
Start: 2024-02-24 | End: 2024-02-25

## 2024-02-24 RX ADMIN — HYDROMORPHONE HYDROCHLORIDE 1 MILLIGRAM(S): 2 INJECTION INTRAMUSCULAR; INTRAVENOUS; SUBCUTANEOUS at 20:30

## 2024-02-24 RX ADMIN — HYDROMORPHONE HYDROCHLORIDE 1 MILLIGRAM(S): 2 INJECTION INTRAMUSCULAR; INTRAVENOUS; SUBCUTANEOUS at 20:15

## 2024-02-24 RX ADMIN — HYDROMORPHONE HYDROCHLORIDE 1 MILLIGRAM(S): 2 INJECTION INTRAMUSCULAR; INTRAVENOUS; SUBCUTANEOUS at 19:50

## 2024-02-24 RX ADMIN — HYDROMORPHONE HYDROCHLORIDE 1 MILLIGRAM(S): 2 INJECTION INTRAMUSCULAR; INTRAVENOUS; SUBCUTANEOUS at 20:45

## 2024-02-24 RX ADMIN — SODIUM CHLORIDE 125 MILLILITER(S): 9 INJECTION, SOLUTION INTRAVENOUS at 00:30

## 2024-02-24 RX ADMIN — Medication 975 MILLIGRAM(S): at 22:59

## 2024-02-24 RX ADMIN — Medication 975 MILLIGRAM(S): at 23:39

## 2024-02-24 NOTE — BRIEF OPERATIVE NOTE - OPERATION/FINDINGS
debridement of bilateral buttock seroma capsule, obliteration of space with quilting sutures, application of wound vac.

## 2024-02-24 NOTE — BRIEF OPERATIVE NOTE - NSICDXBRIEFPROCEDURE_GEN_ALL_CORE_FT
PROCEDURES:  Debridement, soft tissue, lower extremity 24-Feb-2024 19:42:07  Andrei Deleon  Wound VAC dressing change for area greater than 50 sq cm 24-Feb-2024 19:42:23  Andrie Deleon

## 2024-02-24 NOTE — BRIEF OPERATIVE NOTE - NSICDXBRIEFPOSTOP_GEN_ALL_CORE_FT
POST-OP DIAGNOSIS:  Seroma of musculoskeletal structure after musculoskeletal system procedure 24-Feb-2024 19:44:17  Andrei Deleon

## 2024-02-24 NOTE — BRIEF OPERATIVE NOTE - NSICDXBRIEFPREOP_GEN_ALL_CORE_FT
PRE-OP DIAGNOSIS:  Seroma of musculoskeletal structure after musculoskeletal system procedure 24-Feb-2024 19:44:14  Andrei Deleon

## 2024-02-24 NOTE — PROGRESS NOTE ADULT - SUBJECTIVE AND OBJECTIVE BOX
Plastic Surgery Progress Note (pg [facility]: [id number], [facility]: [phone])    SUBJECTIVE  The patient was seen and examined. No acute events overnight.    OBJECTIVE  ___________________________________________________  VITAL SIGNS / I&O's   Vital Signs Last 24 Hrs  T(C): 36.6 (24 Feb 2024 05:31), Max: 37.1 (23 Feb 2024 13:36)  T(F): 97.9 (24 Feb 2024 05:31), Max: 98.7 (23 Feb 2024 13:36)  HR: 77 (24 Feb 2024 05:31) (74 - 88)  BP: 101/68 (24 Feb 2024 05:31) (101/68 - 139/81)  BP(mean): --  RR: 18 (24 Feb 2024 05:31) (17 - 20)  SpO2: 97% (24 Feb 2024 05:31) (97% - 100%)    Parameters below as of 24 Feb 2024 05:31  Patient On (Oxygen Delivery Method): room air          23 Feb 2024 07:01  -  24 Feb 2024 07:00  --------------------------------------------------------  IN:    dextrose 5% + sodium chloride 0.45%: 1000 mL    Oral Fluid: 490 mL  Total IN: 1490 mL    OUT:  Total OUT: 0 mL    Total NET: 1490 mL        ___________________________________________________  PHYSICAL EXAM  CONSTITUTIONAL: No weakness, fevers or chills  RESPIRATORY: equal rise and fall of chest  CARDIOVASCULAR: No chest pain or palpitations  GASTROINTESTINAL: nontender, nondistended  NEUROLOGICAL: No numbness or weakness in BL UE/LE  SKIN: Foul-smelling drainage from bilateral lateral gluteal incisions, packing in place.      ___________________________________________________  LABS                        11.5   5.41  )-----------( 344      ( 23 Feb 2024 11:28 )             37.6     23 Feb 2024 11:28    140    |  103    |  13     ----------------------------<  83     4.6     |  26     |  0.54     Ca    9.2        23 Feb 2024 11:28    TPro  7.0    /  Alb  3.9    /  TBili  0.2    /  DBili  x      /  AST  25     /  ALT  13     /  AlkPhos  63     23 Feb 2024 11:28    PT/INR - ( 23 Feb 2024 12:03 )   PT: 11.4 sec;   INR: 1.04 ratio         PTT - ( 23 Feb 2024 12:03 )  PTT:29.0 sec  CAPILLARY BLOOD GLUCOSE            Urinalysis Basic - ( 23 Feb 2024 11:28 )    Color: x / Appearance: x / SG: x / pH: x  Gluc: 83 mg/dL / Ketone: x  / Bili: x / Urobili: x   Blood: x / Protein: x / Nitrite: x   Leuk Esterase: x / RBC: x / WBC x   Sq Epi: x / Non Sq Epi: x / Bacteria: x      ___________________________________________________  MICRO  Recent Cultures:    ___________________________________________________  MEDICATIONS  (STANDING):  dextrose 5% + sodium chloride 0.45%. 1000 milliLiter(s) (125 mL/Hr) IV Continuous <Continuous>  enoxaparin Injectable 40 milliGRAM(s) SubCutaneous every 24 hours  influenza   Vaccine 0.5 milliLiter(s) IntraMuscular once  senna 2 Tablet(s) Oral at bedtime    MEDICATIONS  (PRN):  acetaminophen     Tablet .. 975 milliGRAM(s) Oral every 6 hours PRN Temp greater or equal to 38C (100.4F), Mild Pain (1 - 3)  artificial tears (preservative free) Ophthalmic Solution 1 Drop(s) Both EYES every 2 hours PRN Dry Eyes  diphenhydrAMINE 25 milliGRAM(s) Oral every 4 hours PRN Rash and/or Itching  ibuprofen  Tablet. 400 milliGRAM(s) Oral every 6 hours PRN Moderate Pain (4 - 6)  melatonin 3 milliGRAM(s) Oral at bedtime PRN Insomnia  ondansetron Injectable 4 milliGRAM(s) IV Push every 4 hours PRN Nausea and/or Vomiting  oxyCODONE    IR 5 milliGRAM(s) Oral every 4 hours PRN Severe Pain (7 - 10)

## 2024-02-24 NOTE — PROGRESS NOTE ADULT - ASSESSMENT
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 S/p washout/debridement and VAC to open wounds of bilateral buttocks s/p silicone removal (initial excision 5/25/23, debridement 10/26, office debridement 12/11).     Plan:   -OR today  -NPO, IVF  -Pain control PRN  -Tylenol for fevers  -DVT prophylaxis w/ SQH       Plastic Surgery,   [phone]

## 2024-02-25 VITALS
RESPIRATION RATE: 18 BRPM | TEMPERATURE: 98 F | HEART RATE: 83 BPM | DIASTOLIC BLOOD PRESSURE: 59 MMHG | SYSTOLIC BLOOD PRESSURE: 93 MMHG | OXYGEN SATURATION: 96 %

## 2024-02-25 LAB
ANION GAP SERPL CALC-SCNC: 11 MMOL/L — SIGNIFICANT CHANGE UP (ref 5–17)
BUN SERPL-MCNC: 10 MG/DL — SIGNIFICANT CHANGE UP (ref 7–23)
CALCIUM SERPL-MCNC: 9 MG/DL — SIGNIFICANT CHANGE UP (ref 8.4–10.5)
CHLORIDE SERPL-SCNC: 101 MMOL/L — SIGNIFICANT CHANGE UP (ref 96–108)
CO2 SERPL-SCNC: 26 MMOL/L — SIGNIFICANT CHANGE UP (ref 22–31)
CREAT SERPL-MCNC: 0.54 MG/DL — SIGNIFICANT CHANGE UP (ref 0.5–1.3)
EGFR: 110 ML/MIN/1.73M2 — SIGNIFICANT CHANGE UP
GLUCOSE SERPL-MCNC: 140 MG/DL — HIGH (ref 70–99)
HCT VFR BLD CALC: 33.1 % — LOW (ref 34.5–45)
HGB BLD-MCNC: 10.1 G/DL — LOW (ref 11.5–15.5)
MCHC RBC-ENTMCNC: 25.7 PG — LOW (ref 27–34)
MCHC RBC-ENTMCNC: 30.5 GM/DL — LOW (ref 32–36)
MCV RBC AUTO: 84.2 FL — SIGNIFICANT CHANGE UP (ref 80–100)
NRBC # BLD: 0 /100 WBCS — SIGNIFICANT CHANGE UP (ref 0–0)
PLATELET # BLD AUTO: 348 K/UL — SIGNIFICANT CHANGE UP (ref 150–400)
POTASSIUM SERPL-MCNC: 4.3 MMOL/L — SIGNIFICANT CHANGE UP (ref 3.5–5.3)
POTASSIUM SERPL-SCNC: 4.3 MMOL/L — SIGNIFICANT CHANGE UP (ref 3.5–5.3)
RBC # BLD: 3.93 M/UL — SIGNIFICANT CHANGE UP (ref 3.8–5.2)
RBC # FLD: 15.3 % — HIGH (ref 10.3–14.5)
SODIUM SERPL-SCNC: 138 MMOL/L — SIGNIFICANT CHANGE UP (ref 135–145)
WBC # BLD: 5.38 K/UL — SIGNIFICANT CHANGE UP (ref 3.8–10.5)
WBC # FLD AUTO: 5.38 K/UL — SIGNIFICANT CHANGE UP (ref 3.8–10.5)

## 2024-02-25 PROCEDURE — 85014 HEMATOCRIT: CPT

## 2024-02-25 PROCEDURE — 80053 COMPREHEN METABOLIC PANEL: CPT

## 2024-02-25 PROCEDURE — 85610 PROTHROMBIN TIME: CPT

## 2024-02-25 PROCEDURE — 86850 RBC ANTIBODY SCREEN: CPT

## 2024-02-25 PROCEDURE — 81025 URINE PREGNANCY TEST: CPT

## 2024-02-25 PROCEDURE — 82435 ASSAY OF BLOOD CHLORIDE: CPT

## 2024-02-25 PROCEDURE — 82330 ASSAY OF CALCIUM: CPT

## 2024-02-25 PROCEDURE — 85025 COMPLETE CBC W/AUTO DIFF WBC: CPT

## 2024-02-25 PROCEDURE — C9399: CPT

## 2024-02-25 PROCEDURE — 99285 EMERGENCY DEPT VISIT HI MDM: CPT

## 2024-02-25 PROCEDURE — 84132 ASSAY OF SERUM POTASSIUM: CPT

## 2024-02-25 PROCEDURE — 82803 BLOOD GASES ANY COMBINATION: CPT

## 2024-02-25 PROCEDURE — 84295 ASSAY OF SERUM SODIUM: CPT

## 2024-02-25 PROCEDURE — 85730 THROMBOPLASTIN TIME PARTIAL: CPT

## 2024-02-25 PROCEDURE — 82947 ASSAY GLUCOSE BLOOD QUANT: CPT

## 2024-02-25 PROCEDURE — 36415 COLL VENOUS BLD VENIPUNCTURE: CPT

## 2024-02-25 PROCEDURE — 85018 HEMOGLOBIN: CPT

## 2024-02-25 PROCEDURE — 83605 ASSAY OF LACTIC ACID: CPT

## 2024-02-25 PROCEDURE — 80048 BASIC METABOLIC PNL TOTAL CA: CPT

## 2024-02-25 PROCEDURE — 85027 COMPLETE CBC AUTOMATED: CPT

## 2024-02-25 PROCEDURE — 93005 ELECTROCARDIOGRAM TRACING: CPT

## 2024-02-25 PROCEDURE — 86900 BLOOD TYPING SEROLOGIC ABO: CPT

## 2024-02-25 PROCEDURE — 86901 BLOOD TYPING SEROLOGIC RH(D): CPT

## 2024-02-25 RX ADMIN — Medication 975 MILLIGRAM(S): at 11:31

## 2024-02-25 RX ADMIN — Medication 100 MILLIGRAM(S): at 02:11

## 2024-02-25 RX ADMIN — Medication 975 MILLIGRAM(S): at 06:16

## 2024-02-25 RX ADMIN — ENOXAPARIN SODIUM 40 MILLIGRAM(S): 100 INJECTION SUBCUTANEOUS at 05:19

## 2024-02-25 RX ADMIN — SENNA PLUS 2 TABLET(S): 8.6 TABLET ORAL at 01:04

## 2024-02-25 RX ADMIN — Medication 975 MILLIGRAM(S): at 12:31

## 2024-02-25 RX ADMIN — Medication 975 MILLIGRAM(S): at 05:16

## 2024-02-25 RX ADMIN — Medication 100 MILLIGRAM(S): at 11:33

## 2024-02-25 NOTE — DISCHARGE NOTE PROVIDER - HOSPITAL COURSE
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 She is status post washout/debridement and VAC to open wounds of bilateral buttocks, s/p silicone removal (initial excision 5/25/23, debridement 10/26, office debridement 12/11). Presented with non-healing wounds and intermittent fevers. On examination, there was foul-smelling drainage from bilateral lateral gluteal incisions with packing in place. Patient was admitted to plastic surgery, made NPO and started on IVF, pain control regimen, Tylenol for fevers, and subcutaneous heparin for DVT prophylaxis. On 2/24, patient went to the OR for debridement of bilateral buttock seroma capsule, obliteration of space with quilting sutures, application of wound Vac.    Patient tolerated the procedure well, without complication. Patient remained hemodynamically stable in the PACU and transferred to the surgical floor. Diet was restarted and advanced as tolerated. Pain control was transitioned from IV to PO pain meds. At this time, patient is currently ambulating, voiding, tolerating a regular diet. Pain well controlled on PO pain meds. Patient has been deemed stable for discharge home with follow up as an outpatient.   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 She is status post washout/debridement and VAC to open wounds of bilateral buttocks, s/p silicone removal (initial excision 5/25/23, debridement 10/26, office debridement 12/11). Presented with non-healing wounds and intermittent fevers. On examination, there was foul-smelling drainage from bilateral lateral gluteal incisions with packing in place. Patient was admitted to plastic surgery, made NPO and started on IVF, pain control regimen, Tylenol for fevers, and subcutaneous heparin for DVT prophylaxis. On 2/24, patient went to the OR for debridement of bilateral buttock seroma capsule, obliteration of space with quilting sutures, application of wound Vac.    Patient tolerated the procedure well, without complication. Patient remained hemodynamically stable in the PACU and transferred to the surgical floor. Diet was restarted and advanced as tolerated. Pain control was transitioned from IV to PO pain meds. At this time, patient is currently ambulating, voiding, tolerating a regular diet. Pain well controlled on PO pain meds. Patient has been deemed stable for discharge home with follow up as an outpatient. Patient will go home with her Vac own supplies.    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 She is status post washout/debridement and VAC to open wounds of bilateral buttocks, s/p silicone removal (initial excision 5/25/23, debridement 10/26, office debridement 12/11). Presented with non-healing wounds and intermittent fevers. On examination, there was foul-smelling drainage from bilateral lateral gluteal incisions with packing in place. Patient was admitted to plastic surgery, made NPO and started on IVF, pain control regimen, Tylenol for fevers, and subcutaneous heparin for DVT prophylaxis. On 2/24, patient went to the OR for debridement of bilateral buttock seroma capsule, obliteration of space with quilting sutures, application of wound Vac.    Patient tolerated the procedure well, without complication. Patient remained hemodynamically stable in the PACU and transferred to the surgical floor. Diet was restarted and advanced as tolerated. Pain control was transitioned from IV to PO pain meds. At this time, patient is currently ambulating, voiding, tolerating a regular diet. Pain well controlled on PO pain meds. Patient has been deemed stable for discharge home with follow up as an outpatient. Patient will go home with her own Vac supplies.

## 2024-02-25 NOTE — DISCHARGE NOTE PROVIDER - NSDCCPCAREPLAN_GEN_ALL_CORE_FT
PRINCIPAL DISCHARGE DIAGNOSIS  Diagnosis: Wound infection  Assessment and Plan of Treatment: ACTIVITY: You may return to your usual level of physical activity. Take Tylenol 500mg every 6 hours as needed for mild to moderate pain.   DIET: Return to your usual diet.  Please follow up with your surgeon, Dr. Dennis

## 2024-02-25 NOTE — DISCHARGE NOTE PROVIDER - NSDCFUADDINST_GEN_ALL_CORE_FT
DIET: You may resume your regular diet.  BATHING: Please do not bathe until cleared by Dr. Dennis. You may sponge bathe until then.   ACTIVITY: No heavy lifting or straining for 2 weeks. Otherwise, you may return to your usual level of physical activity. If you are taking narcotic pain medication (such as vicodin, oxycodone, or Percocet) DO NOT drive a car, operate machinery or make important decisions.  NOTIFY YOUR SURGEON IF: You have any bleeding that does not stop, any pus draining from your wound(s), any fever (over 100.4 F) or chills, persistent nausea/vomiting, persistent diarrhea, or if your pain is not controlled on your discharge pain medications.  WOUND CARE:  Please keep incisions clean and dry. Please do not scrub or rub incisions. Do not use lotion or powder on incisions.    Please take all medications as prescribed by Dr. Dennis.   Please follow up with Dr. Dennis within 1-2 weeks after discharge from the hospital. You may call [phone] to schedule an appointment.     DIET: You may resume your regular diet.  BATHING: Please do not bathe until cleared by Dr. Dennis. You may sponge bathe until then.   ACTIVITY: No heavy lifting or straining for 2 weeks. Otherwise, you may return to your usual level of physical activity. If you are taking narcotic pain medication (such as vicodin, oxycodone, or Percocet) DO NOT drive a car, operate machinery or make important decisions.  NOTIFY YOUR SURGEON IF: You have any bleeding that does not stop, any pus draining from your wound(s), any fever (over 100.4 F) or chills, persistent nausea/vomiting, persistent diarrhea, or if your pain is not controlled on your discharge pain medications.  WOUND CARE:  Please keep incisions clean and dry. Please do not scrub or rub incisions. Do not use lotion or powder on incisions.    Please take all medications as prescribed by Dr. Dennis.   Please follow up with Dr. Dennis within 1-2 weeks after discharge from the hospital. You may call [phone] to schedule an appointment.  Please keep your wound vac on and plugged in until advised otherwise by Dr. Dennis.

## 2024-02-25 NOTE — DISCHARGE NOTE NURSING/CASE MANAGEMENT/SOCIAL WORK - NSDCPEFALRISK_GEN_ALL_CORE
For information on Fall & Injury Prevention, visit: [email or web address] OR  [email or web address] OR  https://www.cdc.gov/steadi/patient.html

## 2024-02-25 NOTE — DISCHARGE NOTE PROVIDER - CARE PROVIDER_API CALL
Get Dennis  Plastic Surgery  [address] [place] [address]  Phone: [phone]  Fax: [phone]  Established Patient  Follow Up Time: 2 weeks

## 2024-02-25 NOTE — PROGRESS NOTE ADULT - ASSESSMENT
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 S/p washout/debridement and VAC to open wounds of bilateral buttocks s/p silicone removal (initial excision 5/25/23, debridement 10/26, office debridement 12/11). Now s/p debridement of bilateral buttock seroma capsule, obliteration of space with quilting sutures, application of wound Vac 2/25.     Plan:   -DVT ppx w/ SQH   -Diet: Regular   -Continue Vac  -Pain control PRN  -Tylenol for fevers      Plastic Surgery,   [phone]

## 2024-02-25 NOTE — PROGRESS NOTE ADULT - SUBJECTIVE AND OBJECTIVE BOX
Plastic Surgery Daily Resident Progress Note    OVERNIGHT:  No acute events.     SUBJECTIVE: Pt seen and examined at bedside on morning rounds. Endorses feeling well.     OBJECTIVE:  Vital Signs Last 24 Hrs  T(C): 36.7 (25 Feb 2024 05:08), Max: 36.9 (25 Feb 2024 01:05)  T(F): 98 (25 Feb 2024 05:08), Max: 98.4 (25 Feb 2024 01:05)  HR: 80 (25 Feb 2024 05:08) (72 - 90)  BP: 103/70 (25 Feb 2024 05:08) (102/69 - 127/62)  BP(mean): 77 (24 Feb 2024 21:15) (77 - 90)  RR: 18 (25 Feb 2024 05:08) (14 - 18)  SpO2: 96% (25 Feb 2024 05:08) (94% - 100%)    Parameters below as of 25 Feb 2024 05:08  Patient On (Oxygen Delivery Method): room air      PHYSICAL EXAM:     CONSTITUTIONAL: No weakness, fevers or chills  RESPIRATORY: equal rise and fall of chest  CARDIOVASCULAR: No chest pain or palpitations  GASTROINTESTINAL: nontender, nondistended  NEUROLOGICAL: No numbness or weakness in BL UE/LE  BUTTOCK: Wound VAC holding suction      Medications:  MEDICATIONS  (STANDING):  acetaminophen     Tablet .. 975 milliGRAM(s) Oral every 6 hours  ceFAZolin   IVPB 2000 milliGRAM(s) IV Intermittent every 8 hours  enoxaparin Injectable 40 milliGRAM(s) SubCutaneous every 24 hours  influenza   Vaccine 0.5 milliLiter(s) IntraMuscular once  senna 2 Tablet(s) Oral at bedtime    MEDICATIONS  (PRN):  artificial tears (preservative free) Ophthalmic Solution 1 Drop(s) Both EYES every 1 hour PRN Dry Eyes  benzocaine/menthol Lozenge 1 Lozenge Oral every 4 hours PRN Sore Throat  diphenhydrAMINE 25 milliGRAM(s) Oral every 4 hours PRN Rash and/or Itching  HYDROmorphone  Injectable 0.5 milliGRAM(s) IV Push every 4 hours PRN breakthrough pain  melatonin 3 milliGRAM(s) Oral at bedtime PRN Insomnia  oxyCODONE    IR 5 milliGRAM(s) Oral every 4 hours PRN Moderate Pain (4 - 6)  oxyCODONE    IR 10 milliGRAM(s) Oral every 6 hours PRN Severe Pain (7 - 10)    Allergies:  No Known Allergies      Labs:  02-23    140  |  103  |  13  ----------------------------<  83  4.6   |  26  |  0.54    Ca    9.2      23 Feb 2024 11:28    TPro  7.0  /  Alb  3.9  /  TBili  0.2  /  DBili  x   /  AST  25  /  ALT  13  /  AlkPhos  63  02-23                          11.5   5.41  )-----------( 344      ( 23 Feb 2024 11:28 )             37.6     PT/INR - ( 23 Feb 2024 12:03 )   PT: 11.4 sec;   INR: 1.04 ratio         PTT - ( 23 Feb 2024 12:03 )  PTT:29.0 sec

## 2024-02-25 NOTE — DISCHARGE NOTE NURSING/CASE MANAGEMENT/SOCIAL WORK - PATIENT PORTAL LINK FT
You can access the FollowMyHealth Patient Portal offered by [facility] by registering at the following website: [email or web address]. By joining [facility]’s FollowMyHealth portal, you will also be able to view your health information using other applications (apps) compatible with our system.

## 2024-04-18 NOTE — PATIENT PROFILE ADULT - NSPROSPHOSPCHAPLAINYN_GEN_A_NUR
GENERAL SURGERY TELEPHONE PROGRESS NOTE    OPERATION:  LAPAROSCOPIC APPENDECTOMY,  LAPAROSCOPIC RIGHT OVARIAN CYSTECTOMY  4/1 (Dr Cornelius and Dr Sandoval)      HPI:  Rosa Taylor is a 31 year old female telephone visit for surgical follow up.  Verbal consent obtained to conduct telephone visit.  Patient states overall she is doing well. Denies taking any pain medications. Having regular bowel movements and urinating without difficulty. She reports she is eating however her appetite has not completely returned. Questioning when she can start working out again.     PATHOLOGY:     A.   Appendix, appendectomy:  -Acute appendicitis and serositis.     B.   Right ovary, cystectomy:  -Mature cystic teratoma.    General: Alert, in no acute distress.  Abdomen: Patient reports incisions are healing well without signs of infection. Reports small bruise to umbilical incision. Denies erythema or drainage. Dermabond flaking.  Neurologic: Appropriate conversation/mentation during telephone call.      ASSESSMENT / PLAN:    S/P laparoscopic appendectomy   -Pathology discussed  -Continue lifting restriction for another week. Ok to do light workouts including walking. Advised to avoid core exercises.   -No further follow up needed at this time. Advised to contact our office if he/she has any issues/concerns.    ASA Escobar  Acute Care Surgery  [phone]  4/18/2024       no

## 2024-05-15 NOTE — ED PROVIDER NOTE - ED STEMI HIDDEN
[No Acute Distress] : no acute distress [PERRL] : pupils equal round and reactive to light [Normal TMs] : both tympanic membranes were normal hide [Supple] : supple [Clear to Auscultation] : lungs were clear to auscultation bilaterally [Regular Rhythm] : with a regular rhythm [No Edema] : there was no peripheral edema [Normal] : soft, non-tender, non-distended, no masses palpated, no HSM and normal bowel sounds [Normal Supraclavicular Nodes] : no supraclavicular lymphadenopathy [Normal Posterior Cervical Nodes] : no posterior cervical lymphadenopathy [Normal Anterior Cervical Nodes] : no anterior cervical lymphadenopathy [No Rash] : no rash [Normal Gait] : normal gait [Normal Affect] : the affect was normal

## 2024-06-21 NOTE — ED PROVIDER NOTE - EKG #1 DATE/TIME
[Dysphagia] : no dysphagia [Loss of Hearing] : no loss of hearing [Nosebleeds] : no nosebleeds [Hoarseness] : no hoarseness [Odynophagia] : no odynophagia [Mucosal Pain] : no mucosal pain [Diarrhea: Grade 0] : Diarrhea: Grade 0 [Joint Pain] : joint pain [Joint Stiffness] : joint stiffness [Muscle Pain] : no muscle pain [Muscle Weakness] : no muscle weakness [Confused] : no confusion [Dizziness] : no dizziness [Fainting] : no fainting [Difficulty Walking] : difficulty walking [Negative] : Allergic/Immunologic [FreeTextEntry4] : L ear congestion  [FreeTextEntry9] : B knee pain  [de-identified] : postherpetic neuralgia over the R scapula  23-Feb-2024 12:40

## 2025-02-17 NOTE — PRE-OP CHECKLIST - HAIR REMOVAL
Rec'd fax from [facility] regarding a Diaetic Eye Exam report    In Dr. Velasquez's [facility]   hair removal not indicated

## (undated) DEVICE — DRAPE 3/4 SHEET W REINFORCEMENT 56X77"

## (undated) DEVICE — VENODYNE/SCD SLEEVE CALF LARGE

## (undated) DEVICE — STRYKER INTERPULSE HANDPIECE W IRR SUCTION TUBE

## (undated) DEVICE — DRAIN JACKSON PRATT 7MM FLAT FULL NO TROCAR

## (undated) DEVICE — GOWN TRIMAX LG

## (undated) DEVICE — TUBING SUCTION 20FT

## (undated) DEVICE — DRAPE INSTRUMENT POUCH 6.75" X 11"

## (undated) DEVICE — DRAIN JACKSON PRATT 10MM FLAT FULL NO TROCAR

## (undated) DEVICE — BLADE SCALPEL SAFETYLOCK #10

## (undated) DEVICE — LAP PAD 18 X 18"

## (undated) DEVICE — POSITIONER FOAM EGG CRATE ULNAR 2PCS (PINK)

## (undated) DEVICE — DRSG VAC GRANUFOAM MEDIUM (BLACK)

## (undated) DEVICE — GLV 6.5 PROTEXIS (WHITE)

## (undated) DEVICE — CANISTER W/GEL INFOVAC 1000ML

## (undated) DEVICE — BLADE SCALPEL SAFETYLOCK #15

## (undated) DEVICE — GLV 7 PROTEXIS (WHITE)

## (undated) DEVICE — CANISTER W/GEL INFOVAC 500ML

## (undated) DEVICE — WARMING BLANKET UPPER ADULT

## (undated) DEVICE — SOL IRR POUR NS 0.9% 500ML

## (undated) DEVICE — WARMING BLANKET LOWER ADULT

## (undated) DEVICE — STAPLER SKIN VISI-STAT 35 WIDE

## (undated) DEVICE — SOL IRR POUR H2O 250ML

## (undated) DEVICE — DRSG VAC GRANUFOAM LARGE (BLACK)

## (undated) DEVICE — GLV 8.5 PROTEXIS (WHITE)

## (undated) DEVICE — DRAPE MAYO STAND 30"

## (undated) DEVICE — DRAPE LIGHT HANDLE COVER (BLUE)

## (undated) DEVICE — FOLEY TRAY 16FR 5CC LTX UMETER CLOSED

## (undated) DEVICE — ONETRAC LIGHTED RETRACTOR 135 X 30MM DISP

## (undated) DEVICE — DRSG XEROFORM 5 X 9"

## (undated) DEVICE — CANISTER KCI 500ML GEL SENSA TRAC

## (undated) DEVICE — DRAIN RESERVOIR FOR JACKSON PRATT 100CC CARDINAL

## (undated) DEVICE — MEDICATION LABELS W MARKER

## (undated) DEVICE — DRAPE TOWEL BLUE 17" X 24"

## (undated) DEVICE — GLV 8 PROTEXIS (WHITE)

## (undated) DEVICE — SPECIMEN CONTAINER 100ML

## (undated) DEVICE — SOL IRR BAG NS 0.9% 3000ML

## (undated) DEVICE — GLV 7.5 PROTEXIS (WHITE)

## (undated) DEVICE — PACK MAJOR ABDOMINAL SUPINE

## (undated) DEVICE — DRAPE 1/2 SHEET 40X57"

## (undated) DEVICE — DRSG VAC GRANUFOAM SMALL (BLACK)

## (undated) DEVICE — DRSG XEROFORM 1 X 8"

## (undated) DEVICE — VISITEC 4X4